# Patient Record
Sex: FEMALE | Race: WHITE | NOT HISPANIC OR LATINO | Employment: OTHER | URBAN - METROPOLITAN AREA
[De-identification: names, ages, dates, MRNs, and addresses within clinical notes are randomized per-mention and may not be internally consistent; named-entity substitution may affect disease eponyms.]

---

## 2017-08-30 ENCOUNTER — OFFICE VISIT - HEALTHEAST (OUTPATIENT)
Dept: INTERNAL MEDICINE | Facility: CLINIC | Age: 57
End: 2017-08-30

## 2017-08-30 ENCOUNTER — COMMUNICATION - HEALTHEAST (OUTPATIENT)
Dept: INTERNAL MEDICINE | Facility: CLINIC | Age: 57
End: 2017-08-30

## 2017-08-30 DIAGNOSIS — Z00.00 ROUTINE GENERAL MEDICAL EXAMINATION AT A HEALTH CARE FACILITY: ICD-10-CM

## 2017-08-30 DIAGNOSIS — E78.00 ELEVATED CHOLESTEROL: ICD-10-CM

## 2017-08-30 DIAGNOSIS — Z12.4 SCREENING FOR CERVICAL CANCER: ICD-10-CM

## 2017-08-30 DIAGNOSIS — Z83.2 FAMILY HISTORY OF THALASSEMIA: ICD-10-CM

## 2017-08-30 LAB
CHOLEST SERPL-MCNC: 245 MG/DL
FASTING STATUS PATIENT QL REPORTED: YES
HDLC SERPL-MCNC: 118 MG/DL
LDLC SERPL CALC-MCNC: 119 MG/DL
TRIGL SERPL-MCNC: 40 MG/DL

## 2017-08-30 ASSESSMENT — MIFFLIN-ST. JEOR: SCORE: 1047.27

## 2017-09-05 LAB
HPV INTERPRETATION - HISTORICAL: NORMAL
HPV INTERPRETER - HISTORICAL: NORMAL

## 2017-10-17 ENCOUNTER — OFFICE VISIT - HEALTHEAST (OUTPATIENT)
Dept: INTERNAL MEDICINE | Facility: CLINIC | Age: 57
End: 2017-10-17

## 2017-10-17 DIAGNOSIS — R21 RASH OF BODY: ICD-10-CM

## 2017-10-19 ENCOUNTER — RECORDS - HEALTHEAST (OUTPATIENT)
Dept: ADMINISTRATIVE | Facility: OTHER | Age: 57
End: 2017-10-19

## 2018-01-23 ENCOUNTER — RECORDS - HEALTHEAST (OUTPATIENT)
Dept: ADMINISTRATIVE | Facility: OTHER | Age: 58
End: 2018-01-23

## 2018-08-30 ENCOUNTER — HOSPITAL ENCOUNTER (OUTPATIENT)
Dept: MAMMOGRAPHY | Facility: CLINIC | Age: 58
Discharge: HOME | End: 2018-08-30
Attending: INTERNAL MEDICINE

## 2018-08-30 DIAGNOSIS — Z12.31 VISIT FOR SCREENING MAMMOGRAM: ICD-10-CM

## 2018-09-27 ENCOUNTER — OFFICE VISIT - HEALTHEAST (OUTPATIENT)
Dept: INTERNAL MEDICINE | Facility: CLINIC | Age: 58
End: 2018-09-27

## 2018-09-27 DIAGNOSIS — E78.2 MIXED HYPERLIPIDEMIA: ICD-10-CM

## 2018-09-27 DIAGNOSIS — Z00.00 ROUTINE GENERAL MEDICAL EXAMINATION AT A HEALTH CARE FACILITY: ICD-10-CM

## 2018-09-27 LAB
ALBUMIN SERPL-MCNC: 4.1 G/DL (ref 3.5–5)
ALP SERPL-CCNC: 111 U/L (ref 45–120)
ALT SERPL W P-5'-P-CCNC: 32 U/L (ref 0–45)
ANION GAP SERPL CALCULATED.3IONS-SCNC: 11 MMOL/L (ref 5–18)
AST SERPL W P-5'-P-CCNC: 37 U/L (ref 0–40)
BILIRUB SERPL-MCNC: 0.4 MG/DL (ref 0–1)
BUN SERPL-MCNC: 21 MG/DL (ref 8–22)
CALCIUM SERPL-MCNC: 10 MG/DL (ref 8.5–10.5)
CHLORIDE BLD-SCNC: 106 MMOL/L (ref 98–107)
CHOLEST SERPL-MCNC: 244 MG/DL
CO2 SERPL-SCNC: 25 MMOL/L (ref 22–31)
CREAT SERPL-MCNC: 0.77 MG/DL (ref 0.6–1.1)
FASTING STATUS PATIENT QL REPORTED: YES
GFR SERPL CREATININE-BSD FRML MDRD: >60 ML/MIN/1.73M2
GLUCOSE BLD-MCNC: 102 MG/DL (ref 70–125)
HDLC SERPL-MCNC: 111 MG/DL
LDLC SERPL CALC-MCNC: 124 MG/DL
POTASSIUM BLD-SCNC: 4.7 MMOL/L (ref 3.5–5)
PROT SERPL-MCNC: 6.9 G/DL (ref 6–8)
SODIUM SERPL-SCNC: 142 MMOL/L (ref 136–145)
TRIGL SERPL-MCNC: 46 MG/DL

## 2018-09-27 ASSESSMENT — MIFFLIN-ST. JEOR: SCORE: 1042.08

## 2018-10-06 ENCOUNTER — COMMUNICATION - HEALTHEAST (OUTPATIENT)
Dept: INTERNAL MEDICINE | Facility: CLINIC | Age: 58
End: 2018-10-06

## 2018-10-10 ENCOUNTER — COMMUNICATION - HEALTHEAST (OUTPATIENT)
Dept: INTERNAL MEDICINE | Facility: CLINIC | Age: 58
End: 2018-10-10

## 2018-10-22 ENCOUNTER — COMMUNICATION - HEALTHEAST (OUTPATIENT)
Dept: INTERNAL MEDICINE | Facility: CLINIC | Age: 58
End: 2018-10-22

## 2019-07-03 ENCOUNTER — RECORDS - HEALTHEAST (OUTPATIENT)
Dept: ADMINISTRATIVE | Facility: OTHER | Age: 59
End: 2019-07-03

## 2019-09-30 ENCOUNTER — OFFICE VISIT - HEALTHEAST (OUTPATIENT)
Dept: FAMILY MEDICINE | Facility: CLINIC | Age: 59
End: 2019-09-30

## 2019-09-30 DIAGNOSIS — Z13.820 SCREENING FOR OSTEOPOROSIS: ICD-10-CM

## 2019-09-30 DIAGNOSIS — Z11.4 SCREENING FOR HIV (HUMAN IMMUNODEFICIENCY VIRUS): ICD-10-CM

## 2019-09-30 DIAGNOSIS — L90.0 LICHEN SCLEROSUS: ICD-10-CM

## 2019-09-30 DIAGNOSIS — Z11.59 ENCOUNTER FOR HEPATITIS C SCREENING TEST FOR LOW RISK PATIENT: ICD-10-CM

## 2019-09-30 DIAGNOSIS — M25.551 HIP PAIN, RIGHT: ICD-10-CM

## 2019-09-30 DIAGNOSIS — Z00.00 HEALTH CARE MAINTENANCE: ICD-10-CM

## 2019-09-30 DIAGNOSIS — G62.9 NEUROPATHY: ICD-10-CM

## 2019-09-30 DIAGNOSIS — Z78.0 ASYMPTOMATIC MENOPAUSE: ICD-10-CM

## 2019-09-30 LAB
ALBUMIN SERPL-MCNC: 4.5 G/DL (ref 3.5–5)
ALP SERPL-CCNC: 100 U/L (ref 45–120)
ALT SERPL W P-5'-P-CCNC: 18 U/L (ref 0–45)
ANION GAP SERPL CALCULATED.3IONS-SCNC: 9 MMOL/L (ref 5–18)
AST SERPL W P-5'-P-CCNC: 25 U/L (ref 0–40)
BILIRUB SERPL-MCNC: 0.6 MG/DL (ref 0–1)
BUN SERPL-MCNC: 17 MG/DL (ref 8–22)
CALCIUM SERPL-MCNC: 10.6 MG/DL (ref 8.5–10.5)
CHLORIDE BLD-SCNC: 105 MMOL/L (ref 98–107)
CHOLEST SERPL-MCNC: 253 MG/DL
CO2 SERPL-SCNC: 29 MMOL/L (ref 22–31)
CREAT SERPL-MCNC: 0.91 MG/DL (ref 0.6–1.1)
FASTING STATUS PATIENT QL REPORTED: YES
GFR SERPL CREATININE-BSD FRML MDRD: >60 ML/MIN/1.73M2
GLUCOSE BLD-MCNC: 103 MG/DL (ref 70–125)
HDLC SERPL-MCNC: 132 MG/DL
HIV 1+2 AB+HIV1 P24 AG SERPL QL IA: NEGATIVE
LDLC SERPL CALC-MCNC: 108 MG/DL
POTASSIUM BLD-SCNC: 4.9 MMOL/L (ref 3.5–5)
PROT SERPL-MCNC: 7.4 G/DL (ref 6–8)
SODIUM SERPL-SCNC: 143 MMOL/L (ref 136–145)
TRIGL SERPL-MCNC: 63 MG/DL

## 2019-09-30 ASSESSMENT — MIFFLIN-ST. JEOR: SCORE: 1034.68

## 2019-10-01 ENCOUNTER — COMMUNICATION - HEALTHEAST (OUTPATIENT)
Dept: FAMILY MEDICINE | Facility: CLINIC | Age: 59
End: 2019-10-01

## 2019-10-01 LAB — HCV AB SERPL QL IA: NEGATIVE

## 2019-10-04 ENCOUNTER — COMMUNICATION - HEALTHEAST (OUTPATIENT)
Dept: FAMILY MEDICINE | Facility: CLINIC | Age: 59
End: 2019-10-04

## 2019-10-04 ENCOUNTER — AMBULATORY - HEALTHEAST (OUTPATIENT)
Dept: FAMILY MEDICINE | Facility: CLINIC | Age: 59
End: 2019-10-04

## 2019-10-04 DIAGNOSIS — E83.52 HYPERCALCEMIA: ICD-10-CM

## 2019-10-15 ENCOUNTER — RECORDS - HEALTHEAST (OUTPATIENT)
Dept: ADMINISTRATIVE | Facility: OTHER | Age: 59
End: 2019-10-15

## 2019-10-15 ENCOUNTER — RECORDS - HEALTHEAST (OUTPATIENT)
Dept: BONE DENSITY | Facility: CLINIC | Age: 59
End: 2019-10-15

## 2019-10-15 DIAGNOSIS — Z78.0 ASYMPTOMATIC MENOPAUSAL STATE: ICD-10-CM

## 2019-10-15 DIAGNOSIS — Z13.820 ENCOUNTER FOR SCREENING FOR OSTEOPOROSIS: ICD-10-CM

## 2019-10-22 ENCOUNTER — RECORDS - HEALTHEAST (OUTPATIENT)
Dept: ADMINISTRATIVE | Facility: OTHER | Age: 59
End: 2019-10-22

## 2019-11-12 ENCOUNTER — AMBULATORY - HEALTHEAST (OUTPATIENT)
Dept: LAB | Facility: CLINIC | Age: 59
End: 2019-11-12

## 2019-11-12 ENCOUNTER — COMMUNICATION - HEALTHEAST (OUTPATIENT)
Dept: FAMILY MEDICINE | Facility: CLINIC | Age: 59
End: 2019-11-12

## 2019-11-12 DIAGNOSIS — E83.52 HYPERCALCEMIA: ICD-10-CM

## 2019-11-12 LAB
CALCIUM SERPL-MCNC: 9.4 MG/DL (ref 8.5–10.5)
CALCIUM, IONIZED MEASURED: 1.23 MMOL/L (ref 1.11–1.3)
ION CA PH 7.4: 1.17 MMOL/L (ref 1.11–1.3)
PH: 7.27 (ref 7.35–7.45)

## 2019-12-02 ENCOUNTER — COMMUNICATION - HEALTHEAST (OUTPATIENT)
Dept: FAMILY MEDICINE | Facility: CLINIC | Age: 59
End: 2019-12-02

## 2019-12-02 DIAGNOSIS — Z91.013 SEAFOOD ALLERGY: ICD-10-CM

## 2020-01-13 ENCOUNTER — RECORDS - HEALTHEAST (OUTPATIENT)
Dept: ADMINISTRATIVE | Facility: OTHER | Age: 60
End: 2020-01-13

## 2020-06-11 ENCOUNTER — RECORDS - HEALTHEAST (OUTPATIENT)
Dept: ADMINISTRATIVE | Facility: OTHER | Age: 60
End: 2020-06-11

## 2020-06-25 ENCOUNTER — RECORDS - HEALTHEAST (OUTPATIENT)
Dept: ADMINISTRATIVE | Facility: OTHER | Age: 60
End: 2020-06-25

## 2020-07-14 ENCOUNTER — RECORDS - HEALTHEAST (OUTPATIENT)
Dept: ADMINISTRATIVE | Facility: OTHER | Age: 60
End: 2020-07-14

## 2020-07-27 ENCOUNTER — RECORDS - HEALTHEAST (OUTPATIENT)
Dept: ADMINISTRATIVE | Facility: OTHER | Age: 60
End: 2020-07-27

## 2020-08-10 ENCOUNTER — OFFICE VISIT - HEALTHEAST (OUTPATIENT)
Dept: FAMILY MEDICINE | Facility: CLINIC | Age: 60
End: 2020-08-10

## 2020-08-10 DIAGNOSIS — Z01.818 PREOP GENERAL PHYSICAL EXAM: ICD-10-CM

## 2020-08-10 DIAGNOSIS — H26.9 CATARACT OF BOTH EYES, UNSPECIFIED CATARACT TYPE: ICD-10-CM

## 2020-08-10 LAB
ATRIAL RATE - MUSE: 58 BPM
BASOPHILS # BLD AUTO: 0 THOU/UL (ref 0–0.2)
BASOPHILS NFR BLD AUTO: 0 % (ref 0–2)
DIASTOLIC BLOOD PRESSURE - MUSE: NORMAL
EOSINOPHIL # BLD AUTO: 0.2 THOU/UL (ref 0–0.4)
EOSINOPHIL NFR BLD AUTO: 4 % (ref 0–6)
ERYTHROCYTE [DISTWIDTH] IN BLOOD BY AUTOMATED COUNT: 14.5 % (ref 11–14.5)
HCT VFR BLD AUTO: 36.9 % (ref 35–47)
HGB BLD-MCNC: 12.7 G/DL (ref 12–16)
INTERPRETATION ECG - MUSE: NORMAL
LYMPHOCYTES # BLD AUTO: 1.2 THOU/UL (ref 0.8–4.4)
LYMPHOCYTES NFR BLD AUTO: 29 % (ref 20–40)
MCH RBC QN AUTO: 32.5 PG (ref 27–34)
MCHC RBC AUTO-ENTMCNC: 34.3 G/DL (ref 32–36)
MCV RBC AUTO: 95 FL (ref 80–100)
MONOCYTES # BLD AUTO: 0.4 THOU/UL (ref 0–0.9)
MONOCYTES NFR BLD AUTO: 10 % (ref 2–10)
NEUTROPHILS # BLD AUTO: 2.3 THOU/UL (ref 2–7.7)
NEUTROPHILS NFR BLD AUTO: 56 % (ref 50–70)
P AXIS - MUSE: 75 DEGREES
PLATELET # BLD AUTO: 279 THOU/UL (ref 140–440)
PMV BLD AUTO: 8.1 FL (ref 7–10)
POTASSIUM BLD-SCNC: 4.2 MMOL/L (ref 3.5–5)
PR INTERVAL - MUSE: 170 MS
QRS DURATION - MUSE: 68 MS
QT - MUSE: 382 MS
QTC - MUSE: 374 MS
R AXIS - MUSE: 57 DEGREES
RBC # BLD AUTO: 3.89 MILL/UL (ref 3.8–5.4)
SYSTOLIC BLOOD PRESSURE - MUSE: NORMAL
T AXIS - MUSE: 47 DEGREES
VENTRICULAR RATE- MUSE: 58 BPM
WBC: 4.1 THOU/UL (ref 4–11)

## 2020-08-10 ASSESSMENT — MIFFLIN-ST. JEOR: SCORE: 1034.03

## 2020-09-01 ENCOUNTER — HOSPITAL ENCOUNTER (OUTPATIENT)
Dept: MAMMOGRAPHY | Facility: CLINIC | Age: 60
Discharge: HOME | End: 2020-09-01

## 2020-09-01 DIAGNOSIS — Z12.31 VISIT FOR SCREENING MAMMOGRAM: ICD-10-CM

## 2020-09-28 ENCOUNTER — COMMUNICATION - HEALTHEAST (OUTPATIENT)
Dept: FAMILY MEDICINE | Facility: CLINIC | Age: 60
End: 2020-09-28

## 2020-10-12 ENCOUNTER — RECORDS - HEALTHEAST (OUTPATIENT)
Dept: ADMINISTRATIVE | Facility: OTHER | Age: 60
End: 2020-10-12

## 2020-10-13 ENCOUNTER — COMMUNICATION - HEALTHEAST (OUTPATIENT)
Dept: FAMILY MEDICINE | Facility: CLINIC | Age: 60
End: 2020-10-13

## 2020-10-15 ENCOUNTER — OFFICE VISIT - HEALTHEAST (OUTPATIENT)
Dept: FAMILY MEDICINE | Facility: CLINIC | Age: 60
End: 2020-10-15

## 2020-10-15 DIAGNOSIS — Z00.00 HEALTHCARE MAINTENANCE: ICD-10-CM

## 2020-10-15 DIAGNOSIS — Z01.818 PREOP GENERAL PHYSICAL EXAM: ICD-10-CM

## 2020-10-15 DIAGNOSIS — Z12.11 SCREEN FOR COLON CANCER: ICD-10-CM

## 2020-10-15 DIAGNOSIS — H25.9 SENILE CATARACT OF RIGHT EYE, UNSPECIFIED AGE-RELATED CATARACT TYPE: ICD-10-CM

## 2020-10-15 LAB
ALBUMIN SERPL-MCNC: 4.2 G/DL (ref 3.5–5)
ALBUMIN UR-MCNC: NEGATIVE MG/DL
ALP SERPL-CCNC: 91 U/L (ref 45–120)
ALT SERPL W P-5'-P-CCNC: 25 U/L (ref 0–45)
ANION GAP SERPL CALCULATED.3IONS-SCNC: 6 MMOL/L (ref 5–18)
APPEARANCE UR: CLEAR
AST SERPL W P-5'-P-CCNC: 30 U/L (ref 0–40)
BACTERIA #/AREA URNS HPF: ABNORMAL HPF
BILIRUB SERPL-MCNC: 0.6 MG/DL (ref 0–1)
BILIRUB UR QL STRIP: NEGATIVE
BUN SERPL-MCNC: 21 MG/DL (ref 8–22)
CALCIUM SERPL-MCNC: 9.5 MG/DL (ref 8.5–10.5)
CHLORIDE BLD-SCNC: 105 MMOL/L (ref 98–107)
CHOLEST SERPL-MCNC: 234 MG/DL
CO2 SERPL-SCNC: 30 MMOL/L (ref 22–31)
COLOR UR AUTO: YELLOW
CREAT SERPL-MCNC: 0.8 MG/DL (ref 0.6–1.1)
FASTING STATUS PATIENT QL REPORTED: YES
GFR SERPL CREATININE-BSD FRML MDRD: >60 ML/MIN/1.73M2
GLUCOSE BLD-MCNC: 93 MG/DL (ref 70–125)
GLUCOSE UR STRIP-MCNC: NEGATIVE MG/DL
HDLC SERPL-MCNC: 117 MG/DL
HGB UR QL STRIP: ABNORMAL
KETONES UR STRIP-MCNC: NEGATIVE MG/DL
LDLC SERPL CALC-MCNC: 107 MG/DL
LEUKOCYTE ESTERASE UR QL STRIP: NEGATIVE
NITRATE UR QL: NEGATIVE
PH UR STRIP: 7 [PH] (ref 5–8)
POTASSIUM BLD-SCNC: 4.7 MMOL/L (ref 3.5–5)
PROT SERPL-MCNC: 6.8 G/DL (ref 6–8)
RBC #/AREA URNS AUTO: ABNORMAL HPF
SODIUM SERPL-SCNC: 141 MMOL/L (ref 136–145)
SP GR UR STRIP: 1.02 (ref 1–1.03)
SQUAMOUS #/AREA URNS AUTO: ABNORMAL LPF
TRIGL SERPL-MCNC: 51 MG/DL
UROBILINOGEN UR STRIP-ACNC: ABNORMAL
WBC #/AREA URNS AUTO: ABNORMAL HPF

## 2020-10-15 ASSESSMENT — MIFFLIN-ST. JEOR: SCORE: 1039.22

## 2020-10-20 ENCOUNTER — COMMUNICATION - HEALTHEAST (OUTPATIENT)
Dept: FAMILY MEDICINE | Facility: CLINIC | Age: 60
End: 2020-10-20

## 2020-10-24 ENCOUNTER — OFFICE VISIT (OUTPATIENT)
Dept: URGENT CARE | Facility: URGENT CARE | Age: 60
End: 2020-10-24
Payer: COMMERCIAL

## 2020-10-24 VITALS
SYSTOLIC BLOOD PRESSURE: 117 MMHG | WEIGHT: 114.8 LBS | HEART RATE: 68 BPM | TEMPERATURE: 97.3 F | DIASTOLIC BLOOD PRESSURE: 72 MMHG | OXYGEN SATURATION: 100 % | RESPIRATION RATE: 17 BRPM

## 2020-10-24 DIAGNOSIS — L03.114 CELLULITIS OF LEFT HAND: ICD-10-CM

## 2020-10-24 DIAGNOSIS — S60.552A SPLINTER OF LEFT HAND: Primary | ICD-10-CM

## 2020-10-24 PROCEDURE — 99203 OFFICE O/P NEW LOW 30 MIN: CPT | Performed by: FAMILY MEDICINE

## 2020-10-24 RX ORDER — CEPHALEXIN 500 MG/1
500 CAPSULE ORAL 3 TIMES DAILY
Qty: 21 CAPSULE | Refills: 0 | Status: SHIPPED | OUTPATIENT
Start: 2020-10-24 | End: 2020-10-31

## 2020-10-24 RX ORDER — FOLIC ACID 0.8 MG
800 TABLET ORAL DAILY
COMMUNITY
End: 2023-12-01

## 2020-10-24 NOTE — PATIENT INSTRUCTIONS
Apply warmth onto the red, tender areas of the left hand for 15 minutes at a time, every 2-3 hours while awake.      You may apply ice at times to numb up the pain.      Tylenol, Ibuprofen for the pain.      Elevate the left hand above the level of the heart to decrease some of the pain.      Go to the emergency room if you develop fevers (temperature 100.4 F or above), spreading redness despite 48 hours of Cephalexin antibiotic treatment, if the pain keeps worsening.      follow up with an orthopedic hand specialist this week for further evaluation and treatment.

## 2020-10-24 NOTE — PROGRESS NOTES
SUBJECTIVE:   Estefany Javier is a 60 year old right-handed female presenting with a chief complaint of soreness, redness, throbbing over the remnants of a wooden splinter embedded in the left palm overlying the fifth metacarpal area, patient feels that there is still something embedded at the left hand. Yesterday, at around 5 pm, patient was wiping the stair banisters when a wooden splinter embedded into the patient's left hand. .  Patient and her  removed most of the sliver/splinter from the left hand with a tweezer.  Patient also has applied Neosporin ointment.     Onset of symptoms was one day ago.  Course of illness is worsening pain and redness.  .    Severity severe pain.   Current and Associated symptoms: as listed above.   .  No fevers.  No red streaks.  No pus was visible.  There is pain when making a fist with the left hand and when extending the left fifth finger.     Patient is up to date with her tetanus shot.      Past Medical History:    Lichen Sclerosis    Current Outpatient Medications   Medication Sig Dispense Refill     folic acid 800 MCG tablet Take 800 mcg by mouth daily       methotrexate 2.5 MG tablet Take by mouth every 7 days       Social History     Tobacco Use     Smoking status: Not on file   Substance Use Topics     Alcohol use: Not on file       ROS:  CONSTITUTIONAL:NEGATIVE  for fevers.   INTEGUMENTARY/SKIN: positive for redness, swelling at the left hand.   MUSCULOSKELETAL: positive for pain at the left palm.    NEURO: No numbness nor weakness at the left hand.      OBJECTIVE:  /72   Pulse 68   Temp 97.3  F (36.3  C)   Resp 17   Wt 52.1 kg (114 lb 12.8 oz)   SpO2 100%   GENERAL APPEARANCE: healthy, alert and no distress  MS: left palm:  There is a puncture jailyn at the left palm overlying the fifth metacarpal bone region.  There is overlying edema, blanching, erythema, and pain with palpation.  Patient has pain when making a fist and when stretching the fingers.  No  red streaks.  There are no obvious protruding splinter ends.  I was unable to trace any obvious paths of the splinter with palpation.      ASSESSMENT:  Cellulitis of the left hand  Remnants of a splinter in the left hand.     PLAN:  follow up with an orthopedic hand specialist. I ordered a referral for this orthopedic evaluation.     Rx:  Cephalexin    Tylenol, ibuprofen    Place warmth    Go to the emergency room if you develop fevers (temperature 100.4 F or above), spreading redness despite 48 hours of Cephalexin antibiotic treatment, if the pain keeps worsening.          Elmer Mac MD

## 2020-10-26 ENCOUNTER — TRANSFERRED RECORDS (OUTPATIENT)
Dept: HEALTH INFORMATION MANAGEMENT | Facility: CLINIC | Age: 60
End: 2020-10-26
Payer: COMMERCIAL

## 2020-10-26 ENCOUNTER — RECORDS - HEALTHEAST (OUTPATIENT)
Dept: ADMINISTRATIVE | Facility: OTHER | Age: 60
End: 2020-10-26

## 2020-11-02 ENCOUNTER — RECORDS - HEALTHEAST (OUTPATIENT)
Dept: ADMINISTRATIVE | Facility: OTHER | Age: 60
End: 2020-11-02
Payer: COMMERCIAL

## 2020-11-12 ENCOUNTER — RECORDS - HEALTHEAST (OUTPATIENT)
Dept: ADMINISTRATIVE | Facility: OTHER | Age: 60
End: 2020-11-12

## 2020-11-13 ENCOUNTER — COMMUNICATION - HEALTHEAST (OUTPATIENT)
Dept: FAMILY MEDICINE | Facility: CLINIC | Age: 60
End: 2020-11-13

## 2020-12-01 ENCOUNTER — COMMUNICATION - HEALTHEAST (OUTPATIENT)
Dept: FAMILY MEDICINE | Facility: CLINIC | Age: 60
End: 2020-12-01

## 2020-12-01 DIAGNOSIS — Z91.013 SEAFOOD ALLERGY: ICD-10-CM

## 2020-12-15 ENCOUNTER — RECORDS - HEALTHEAST (OUTPATIENT)
Dept: GENERAL RADIOLOGY | Facility: CLINIC | Age: 60
End: 2020-12-15

## 2020-12-15 ENCOUNTER — OFFICE VISIT - HEALTHEAST (OUTPATIENT)
Dept: FAMILY MEDICINE | Facility: CLINIC | Age: 60
End: 2020-12-15

## 2020-12-15 DIAGNOSIS — R10.9 LEFT FLANK PAIN: ICD-10-CM

## 2020-12-15 DIAGNOSIS — S20.212A CONTUSION OF LEFT FRONT WALL OF THORAX, INITIAL ENCOUNTER: ICD-10-CM

## 2020-12-15 DIAGNOSIS — R31.9 HEMATURIA, UNSPECIFIED TYPE: ICD-10-CM

## 2020-12-15 DIAGNOSIS — S20.212A RIB CONTUSION, LEFT, INITIAL ENCOUNTER: ICD-10-CM

## 2020-12-15 LAB
ALBUMIN UR-MCNC: NEGATIVE MG/DL
APPEARANCE UR: CLEAR
BACTERIA #/AREA URNS HPF: ABNORMAL HPF
BILIRUB UR QL STRIP: NEGATIVE
COLOR UR AUTO: YELLOW
GLUCOSE UR STRIP-MCNC: NEGATIVE MG/DL
HGB UR QL STRIP: ABNORMAL
KETONES UR STRIP-MCNC: NEGATIVE MG/DL
LEUKOCYTE ESTERASE UR QL STRIP: NEGATIVE
MUCOUS THREADS #/AREA URNS LPF: ABNORMAL LPF
NITRATE UR QL: NEGATIVE
PH UR STRIP: 5.5 [PH] (ref 5–8)
RBC #/AREA URNS AUTO: ABNORMAL HPF
SP GR UR STRIP: 1.02 (ref 1–1.03)
SQUAMOUS #/AREA URNS AUTO: ABNORMAL LPF
UROBILINOGEN UR STRIP-ACNC: ABNORMAL
WBC #/AREA URNS AUTO: ABNORMAL HPF

## 2020-12-16 ENCOUNTER — AMBULATORY - HEALTHEAST (OUTPATIENT)
Dept: FAMILY MEDICINE | Facility: CLINIC | Age: 60
End: 2020-12-16

## 2020-12-16 ENCOUNTER — COMMUNICATION - HEALTHEAST (OUTPATIENT)
Dept: FAMILY MEDICINE | Facility: CLINIC | Age: 60
End: 2020-12-16

## 2020-12-16 DIAGNOSIS — R10.9 LEFT FLANK PAIN: ICD-10-CM

## 2020-12-23 ENCOUNTER — HOSPITAL ENCOUNTER (OUTPATIENT)
Dept: CT IMAGING | Facility: CLINIC | Age: 60
Discharge: HOME | End: 2020-12-23
Attending: FAMILY MEDICINE

## 2020-12-23 DIAGNOSIS — R10.9 LEFT FLANK PAIN: ICD-10-CM

## 2021-02-01 ENCOUNTER — COMMUNICATION - HEALTHEAST (OUTPATIENT)
Dept: FAMILY MEDICINE | Facility: CLINIC | Age: 61
End: 2021-02-01

## 2021-05-31 ENCOUNTER — RECORDS - HEALTHEAST (OUTPATIENT)
Dept: ADMINISTRATIVE | Facility: CLINIC | Age: 61
End: 2021-05-31

## 2021-05-31 VITALS — BODY MASS INDEX: 20 KG/M2 | WEIGHT: 112.9 LBS | HEIGHT: 63 IN

## 2021-05-31 VITALS — BODY MASS INDEX: 20.77 KG/M2 | WEIGHT: 116.3 LBS

## 2021-06-01 NOTE — TELEPHONE ENCOUNTER
Left message to call back for: results  Information to relay to patient:  Please notify pt and schedule lab appt

## 2021-06-01 NOTE — PROGRESS NOTES
Assessment/Plan:     Patient presents today for routine physical examination.    Healthcare Maintenance: USPSTF recommendations for age 59;  Patient has been counseled on/screened for:  - intimate partner violence and there are no concerns at this time  - a healthful diet and physical activity for CVD disease prevention  - Diabetes and hyperlipidemia: screening was performed.   - Hep C, ordered today  - Asprin use: patient chose to consider it  Sexually transmitted infections: Patient would no like to be screened for chlamydia, gonorrhea, syphilis, HIV, and hepatitis, HIV ok  Colorectal Cancer: Colonoscopy last performed in 2011, return in 10 years  Immunizations: up to date except for influenza and shingles which was recommended  Cervical Cancer Screening: patient has been screened for cervical cancer per protocol in 2017, results were negative with negative HPV due in 2022  Breast Cancer: Last mammogram in 2018  Bone Density: Dexa scan ordered      Additional concerns are as detailed below:    1. Neuropathy  2. Hip pain, right  Patient's persistent stocking distribution of right sided lower extremity neuropathy is a great concern for the patient for the last 6 years.  We discussed the options of treating with gabapentin or TCA, additional diagnostic work-up with an x-ray followed by an MRI versus an EMG, versus a referral to orthopedics.  Patient would prefer a referral and I think this is reasonable.  - Ambulatory referral to Orthopedics    3. Lichen sclerosus  - managed by ob/gyn      AVS printed and given to patient.  Return to clinic in 1 year.    I have had an Advance Directives discussion with the patient.    Total time spent with patient was 45 minutes with greater than 50% spent in face-to-face counseling regarding the above plan.    This note has been dictated using voice recognition software. Any grammatical or context distortions are unintentional and inherent to the the software.     Roselyn  "MD Jace  [facility]    Subjective:     Estefany Javier is a 59 y.o. female who presents to clinic for routine physical and to establish care.    Additional concerns include:    1.  Patient has a history of a microdiscectomy between L4 and L5 less than a decade ago.  She had extensive imaging at that time.  She also has a known torn labrum in the right hip.  She has had a hip injection in 2014.  She has had extensive physical therapy.  She persistent having discomfort.  This discomfort is described as 3 separate sensations.  The most concerning is a stocking distribution in the right lower extremity of paresthesias.  It is not a pain, but will persist all day when aggravated.  She usually will have some irritation at least daily.  It is worsened with sitting.  Once it has begun, she has not noticed any improvement despite changing positions, standing, or physical therapy.  The only thing that will improve her discomfort is lying down to sleep.  Patient has not taken medication for this.  She also endorses slight \"lump\" feeling in the right groin, that is more prominent when she bends at the waist.  She also notices some slight discomfort along the iliac crest on the right side.  She notices no significant back pain.  She notices no discomfort with high knees.    Diet: well balanced diet  Physical Activity: The patient maintains a regular, healthy exercise program.    PHQ-2 Score:  0    Health Care Directive: not on file but handout provided    Patient Care Team:   PCP: Roselyn Mccormick    Past Medical History, Family History, and Social History reviewed.     Review of systems is as stated in HPI.  Patient endorses: joint pain and back pain  The remainder of the 10 system review is otherwise negative.    Objective:     /80   Pulse 74   Ht 5' 2.5\" (1.588 m)   Wt 111 lb (50.3 kg)   SpO2 100%   BMI 19.98 kg/m    Gen: Alert, NAD, appears stated age, normal hygiene   Eyes: conjunctivae " without injection, sclera clear, EOMI  ENT/mouth: nares clear, septum midline, absent rhinorrhea,absent pharyngeal injection, neck is supple, no thyroid enlargement  CV: RRR, no murmur appreciated, pedal edema absent bilaterally  Resp: CTAB, no wheezes, rales or ronchi  ABD: normoactive, non-tender to palpation, nondistended  MSK: grossly full range of motion in all joints, no obvious deformity, no piriformis discomfort, no evidence of foot drop, no decreased range of motion in the spine, nontender to palpation along the iliac crests or in the right groin  Neuro: CN II-XII grossly intact, no deficits in coordination  Psych: no apparent hallucinations or delusions, no pressured speech; alert, oriented x3  SKIN: dry and without lesions  Heme/lymph: no pallor, no active bleeding/bruising, no adenopathy appreciated    Medications:  Current Outpatient Medications   Medication Sig     CALCIUM CARBONATE/VITAMIN D3 (CALTRATE 600 + D ORAL) Take by mouth.     clobetasol (TEMOVATE) 0.05 % cream APPLY TO AFFECTED AREA TWICE A DAY AS NEEDED     EPINEPHrine (EPIPEN/ADRENACLICK/AUVI-Q) 0.3 mg/0.3 mL injection Inject 0.3 mL (0.3 mg total) as directed as needed for anaphylaxis. Inject into thigh.     omega-3 fatty acids-vitamin E (FISH OIL) 1,000 mg cap Take by mouth.       Allergies:  Allergies   Allergen Reactions     Iodine      Shellfish Containing Products        PMH:  Past Medical History:   Diagnosis Date     Adhesive capsulitis of shoulder     Created by Conversion      Anemia     Created by Conversion      Incontinence      Leukopenia     Created by Conversion        PSH:  Past Surgical History:   Procedure Laterality Date     MICRODISCECTOMY LUMBAR       [place] REPAIR BLADDER WOUND/INJ,SIMPLE      Description: Bladder Cystorrhaphy;  Recorded: 05/06/2008;       Family Hx:  Family History   Problem Relation Age of Onset     Dementia Mother      Prostate cancer Father      Heart disease Father      Thalassemia Sister          alpha thalassemia trait     BRCA 1/2 Neg Hx      Breast cancer Neg Hx      Cancer Neg Hx      Colon cancer Neg Hx      Endometrial cancer Neg Hx      Ovarian cancer Neg Hx        Social History:  Social History     Socioeconomic History     Marital status: Single     Spouse name: Not on file     Number of children: Not on file     Years of education: Not on file     Highest education level: Not on file   Occupational History     Not on file   Social Needs     Financial resource strain: Not on file     Food insecurity:     Worry: Not on file     Inability: Not on file     Transportation needs:     Medical: Not on file     Non-medical: Not on file   Tobacco Use     Smoking status: Former Smoker     Packs/day: 0.00     Last attempt to quit: 1995     Years since quittin.1     Smokeless tobacco: Never Used   Substance and Sexual Activity     Alcohol use: Yes     Alcohol/week: 7.0 standard drinks     Types: 7 Glasses of wine per week     Drug use: Never     Sexual activity: Yes     Partners: Male   Lifestyle     Physical activity:     Days per week: Not on file     Minutes per session: Not on file     Stress: Not on file   Relationships     Social connections:     Talks on phone: Not on file     Gets together: Not on file     Attends Yazidi service: Not on file     Active member of club or organization: Not on file     Attends meetings of clubs or organizations: Not on file     Relationship status: Not on file     Intimate partner violence:     Fear of current or ex partner: Not on file     Emotionally abused: Not on file     Physically abused: Not on file     Forced sexual activity: Not on file   Other Topics Concern     Not on file   Social History Narrative     Not on file       LDL Calculated (mg/dL)   Date Value   2018 124   2017 119   2016 113        Immunization History   Administered Date(s) Administered     Hep A, Adult IM (19yr & older) 2009, 2010     Hep A, historic  05/14/2009, 06/17/2010     Influenza, Seasonal, Inj PF IIV3 10/19/2009     Influenza, inj, historic,unspecified 10/07/2015, 10/18/2017     Influenza,seasonal, Inj IIV3 11/01/2010, 10/24/2011, 10/08/2012, 10/14/2013, 10/20/2014     Td, Adult, Absorbed 08/30/2017     Td,adult,historic,unspecified 05/04/2007     Tdap 05/04/2007     ZOSTER, LIVE 07/17/2014     There are no preventive care reminders to display for this patient.

## 2021-06-01 NOTE — TELEPHONE ENCOUNTER
----- Message from Bonita Gallardo CMA sent at 10/1/2019 12:21 PM CDT -----  Regarding: FW: calcium    ----- Message -----  From: Roselyn Mccormick MD  Sent: 10/1/2019  12:20 PM CDT  To: Roselyn Mccormick Care Team Pool  Subject: calcium                                          Patient's calcium is slightly high. I would like to recheck at her convenience. She will need a lab only appt but I was hoping you'd be willing to reach out to her to ensure she knows she needs to schedule.  ----- Message -----  From: Lab, Background User  Sent: 9/30/2019   4:43 PM CDT  To: Roselyn Mccormick MD

## 2021-06-02 ENCOUNTER — RECORDS - HEALTHEAST (OUTPATIENT)
Dept: ADMINISTRATIVE | Facility: CLINIC | Age: 61
End: 2021-06-02

## 2021-06-02 VITALS — HEIGHT: 63 IN | WEIGHT: 111.6 LBS | BODY MASS INDEX: 19.77 KG/M2

## 2021-06-03 VITALS
OXYGEN SATURATION: 100 % | BODY MASS INDEX: 19.67 KG/M2 | HEART RATE: 74 BPM | SYSTOLIC BLOOD PRESSURE: 110 MMHG | DIASTOLIC BLOOD PRESSURE: 80 MMHG | WEIGHT: 111 LBS | HEIGHT: 63 IN

## 2021-06-03 NOTE — TELEPHONE ENCOUNTER
Patient Returning Call  Reason for call:  Returning call  Information relayed to patient:  Below message  Patient has additional questions:  No  If YES, what are your questions/concerns:  Patient just needs Lab. Reschedule for lab only visit today at 0315 pm. Thanks.  Okay to leave a detailed message?: No call back needed

## 2021-06-03 NOTE — TELEPHONE ENCOUNTER
Left message to call back for: Appt today with dr. Mccormick at 230  Information to relay to patient:  LM for pt to clarify if she needs appt to discuss anything with dr. Mccormick? Otherwise she just needs lab only appt today.,

## 2021-06-03 NOTE — TELEPHONE ENCOUNTER
Upcoming Appointment Question  When is the appointment: Today  What is your appointment for?: Calcium check  Who is your appointment scheduled with?: [facility]  What is your question/concern?: Fyi: patient has lab visit scheduled for today. Appears order in chart. Thanks.  Okay to leave a detailed message?: No return call needed.

## 2021-06-04 VITALS
WEIGHT: 111.2 LBS | HEIGHT: 62 IN | BODY MASS INDEX: 20.46 KG/M2 | OXYGEN SATURATION: 98 % | DIASTOLIC BLOOD PRESSURE: 78 MMHG | HEART RATE: 71 BPM | SYSTOLIC BLOOD PRESSURE: 106 MMHG

## 2021-06-05 ENCOUNTER — RECORDS - HEALTHEAST (OUTPATIENT)
Dept: SCHEDULING | Facility: CLINIC | Age: 61
End: 2021-06-05

## 2021-06-05 VITALS
OXYGEN SATURATION: 99 % | HEART RATE: 71 BPM | SYSTOLIC BLOOD PRESSURE: 118 MMHG | WEIGHT: 114.6 LBS | BODY MASS INDEX: 20.63 KG/M2 | DIASTOLIC BLOOD PRESSURE: 64 MMHG

## 2021-06-05 VITALS
DIASTOLIC BLOOD PRESSURE: 70 MMHG | BODY MASS INDEX: 19.84 KG/M2 | HEART RATE: 64 BPM | OXYGEN SATURATION: 100 % | SYSTOLIC BLOOD PRESSURE: 102 MMHG | WEIGHT: 112 LBS | HEIGHT: 63 IN

## 2021-06-05 DIAGNOSIS — M25.559 PAIN IN JOINT, PELVIC REGION AND THIGH: ICD-10-CM

## 2021-06-10 NOTE — PROGRESS NOTES
Preoperative Exam    Scheduled Procedure: Cataract Surgery- Both eyes Left 8/12 Right 2 weeks later  Surgery Date:  8/12/2020  Surgery Location: [facility], [place], fax [phone]    Surgeon:  Dr. Gordon     Assessment/Plan:     1. Cataract of both eyes, unspecified cataract type  Work-up to include  - HM1(CBC and Differential)  - Potassium  - Electrocardiogram Perform - Clinic  - HM1 (CBC with Diff)    2. Preop general physical exam  Work-up to include  - HM1(CBC and Differential)  - Potassium  - Electrocardiogram Perform - Clinic  - HM1 (CBC with Diff)     Surgical Procedure Risk: Low (reported cardiac risk generally < 1%)  Have you had prior anesthesia?: No  Have you or any family members had a previous anesthesia reaction:  Yes: usually gets sick; nauesa   Do you or any family members have a history of a clotting or bleeding disorder?: No  Cardiac Risk Assessment: no increased risk for major cardiac complications    APPROVAL GIVEN to proceed with proposed procedure, without further diagnostic evaluation        Functional Status: Independent  Patient plans to recover at home with family.     Subjective:      Estefany Javier is a 59 y.o. female who presents for a preoperative consultation.  Patient has been noticing decreasing vision for over a year now.  Previously she saw a optometrist.  Her family eye doctor has been Dr. Santana and she had a consultation with him and he noticed rather marked cataracts in the left eye and also another cataract in the right eye.  She is scheduled for phacoemulsification of the left eye first and then to follow-up 2 weeks later the right eye.  She has been very healthy.  She does have lichen sclerosis moderately severe and is under antimetabolite therapy once weekly.  She is under the care of dermatology.  She is getting a good response also using clobetasol cream daily.  System review unremarkable the patient has been therapeutically optimized for anticipated  procedures.  All medical questions asked were answered at this physical examination should satisfy 2 surgeries.  Pleasure to meter.  She may go ahead and schedule her colonoscopy.    All other systems reviewed and are negative, other than those listed in the HPI.    Pertinent History  Do you have difficulty breathing or chest pain after walking up a flight of stairs: No  History of obstructive sleep apnea: No  Steroid use in the last 6 months: No  Frequent Aspirin/NSAID use: Yes: Takes 2-3 advil a day   Prior Blood Transfusion: No  Prior Blood Transfusion Reaction: No  If for some reason prior to, during or after the procedure, if it is medically indicated, would you be willing to have a blood transfusion?:  There is no transfusion refusal.    Current Outpatient Medications   Medication Sig Dispense Refill     CALCIUM CARBONATE/VITAMIN D3 (CALTRATE 600 + D ORAL) Take by mouth.       clobetasol (TEMOVATE) 0.05 % cream APPLY TO AFFECTED AREA TWICE A DAY AS NEEDED  2     EPINEPHrine (EPIPEN/ADRENACLICK/AUVI-Q) 0.3 mg/0.3 mL injection Inject 0.3 mL (0.3 mg total) as directed as needed for anaphylaxis. Inject into thigh. 2 Pre-filled Pen Syringe 0     folic acid (FOLVITE) 1 MG tablet Take 1 mg by mouth 6 (six) times a day. Take one tablet Monday-Saturday       methotrexate 2.5 MG tablet Take 2.5 mg by mouth once a week.       omega-3 fatty acids-vitamin E (FISH OIL) 1,000 mg cap Take by mouth.       prednisoLONE acetate (PRED-FORTE) 1 % ophthalmic suspension        No current facility-administered medications for this visit.         Allergies   Allergen Reactions     Shellfish Containing Products Anaphylaxis     Iodine Other (See Comments)       Patient Active Problem List   Diagnosis     Hip pain, right     Seafood allergy     Neuropathy     Lichen sclerosus       Past Medical History:   Diagnosis Date     Adhesive capsulitis of shoulder     Created by Conversion      Anemia     Created by Conversion      Incontinence   "    Leukopenia     Created by Conversion        Past Surgical History:   Procedure Laterality Date     MICRODISCECTOMY LUMBAR       [place] REPAIR BLADDER WOUND/INJ,SIMPLE      Description: Bladder Cystorrhaphy;  Recorded: 2008;       Social History     Socioeconomic History     Marital status: Single     Spouse name: Not on file     Number of children: Not on file     Years of education: Not on file     Highest education level: Not on file   Occupational History     Not on file   Social Needs     Financial resource strain: Not on file     Food insecurity     Worry: Not on file     Inability: Not on file     Transportation needs     Medical: Not on file     Non-medical: Not on file   Tobacco Use     Smoking status: Former Smoker     Packs/day: 0.00     Last attempt to quit: 1995     Years since quittin.9     Smokeless tobacco: Never Used   Substance and Sexual Activity     Alcohol use: Yes     Alcohol/week: 7.0 standard drinks     Types: 7 Glasses of wine per week     Drug use: Never     Sexual activity: Yes     Partners: Male   Lifestyle     Physical activity     Days per week: Not on file     Minutes per session: Not on file     Stress: Not on file   Relationships     Social connections     Talks on phone: Not on file     Gets together: Not on file     Attends Cheondoism service: Not on file     Active member of club or organization: Not on file     Attends meetings of clubs or organizations: Not on file     Relationship status: Not on file     Intimate partner violence     Fear of current or ex partner: Not on file     Emotionally abused: Not on file     Physically abused: Not on file     Forced sexual activity: Not on file   Other Topics Concern     Not on file   Social History Narrative     Not on file             Objective:     Vitals:    08/10/20 0822   BP: 106/78   Pulse: 71   SpO2: 98%   Weight: 111 lb 3.2 oz (50.4 kg)   Height: 5' 2.4\" (1.585 m)         Physical Exam:  General Appearance:  Alert, " cooperative, no distress  Head:  Normocephalic, no obvious abnormality  Ears: TM anatomy normal  Eyes:  PERRL, EOM's intact, conjunctiva and corneas clear bilateral cataracts left greater than right  Nose:  Nares symmetrical, septum midline, mucosa pink, no sinus tenderness  Throat:  Lips, tongue, and mucosa are moist, pink, and intact  Neck:  Supple, symmetrical, trachea midline, no adenopathy; thyroid: no enlargement, symmetric,no tenderness/mass/nodules; no carotid bruit, no JVD  Back:  Symmetrical, no curvature, ROM normal, no CVA tenderness  Chest/Breast:  No mass or tenderness  Lungs:  Clear to auscultation bilaterally, respirations unlabored   Heart:  Normal PMI, regular rate & rhythm, S1 and S2 normal, no murmurs, rubs, or gallops  Abdomen:  Soft, non-tender, bowel sounds active all four quadrants, no mass, or organomegaly  Musculoskeletal:  Tone and strength strong and symmetrical, all extremities  Lymphatic:  No adenopathy  Skin/Hair/Nails:  Skin warm, dry, and intact, no rashes  Neurologic:  Alert and oriented x3, no cranial nerve deficits, normal strength and tone, gait steady  Extremities:  No edema.  Meri's sign negative.    Genitourinary: deferred  Pulses:  Equal bilaterally    There are no Patient Instructions on file for this visit.    EKG: Normal EKG    Labs:  Labs pending at this time.  Results will be reviewed when available.    Immunization History   Administered Date(s) Administered     Hep A, Adult IM (19yr & older) 05/14/2009, 06/17/2010     Hep A, historic 05/14/2009, 06/17/2010     Influenza, Seasonal, Inj PF IIV3 10/19/2009     Influenza, inj, historic,unspecified 10/07/2015, 10/18/2017     Influenza,seasonal, Inj IIV3 11/01/2010, 10/24/2011, 10/08/2012, 10/14/2013, 10/20/2014     Td, Adult, Absorbed 08/30/2017     Td,adult,historic,unspecified 05/04/2007     Tdap 05/04/2007     ZOSTER, LIVE 07/17/2014           Electronically signed by Jose Snider MD 08/10/20 8:24 AM

## 2021-06-12 NOTE — PROGRESS NOTES
Assessment/Plan:     Patient presents today for routine physical examination.    Healthcare Maintenance: USPSTF recommendations for age 60;  Patient has been counseled on/screened for:  - intimate partner violence and there are no concerns at this time  - a healthful diet and physical activity for CVD prevention  - Diabetes and hyperlipidemia: screening was performed.   - Hep C, negative in 2019  - Asprin use: patient chose not to start  - history of smoking, patient is not eligible for the low dose chest CT today  Sexually transmitted infections: Patient would not like to be screened for chlamydia, gonorrhea, syphilis, HIV, and hepatitis  Colorectal Cancer: Cologuard ordered today  Immunizations: up to date except for shingles which was recommended  Cervical Cancer Screening: patient has been screened for cervical cancer per protocol in 2017, results were negative with negative HPV, due in 2022  Mammogram: just completed    AVS printed and given to patient.  Return to clinic in 1 year.     I have had an Advance Directives discussion with the patient.    This note has been dictated using voice recognition software. Any grammatical or context distortions are unintentional and inherent to the the software.     Roselyn Mccormick MD  Family Medicine [facility]    Subjective:     Estefany Javier is a 60 y.o. female who presents to clinic for routine physical.    Additional concerns include:    1. No major concerns. Hip pain is still present. She has been to an orthopedist and the [facility]. Patient has done acupuncture, dry needling, functional medicine and a chiropractor for the pain. Lichen sclerosis is managed with methotrexate.     PHQ-2 Score:  0    Health Care Directive: discussed    Patient Care Team:   PCP: Roselyn Mccormick     Past Medical History, Family History, and Social History reviewed.     Review of systems:  Patient endorses: nothing  The remainder of the 10 system review is otherwise  "negative.    Objective:     /70   Pulse 64   Ht 5' 2.5\" (1.588 m)   Wt 112 lb (50.8 kg)   SpO2 100%   BMI 20.16 kg/m    Gen: Alert, NAD, appears stated age, normal hygiene   Eyes: conjunctivae without injection, sclera clear, EOMI  ENT/mouth: nares clear, septum midline, absent rhinorrhea,absent pharyngeal injection, neck is supple, no thyroid enlargement  CV: RRR, no murmur appreciated, pedal edema absent bilaterally  Resp: CTAB, no wheezes, rales or ronchi  ABD: normoactive, non-tender to palpation, nondistended  MSK: grossly full range of motion in all joints, no obvious deformity  Neuro: CN II-XII grossly intact, no deficits in coordination  Psych: no apparent hallucinations or delusions, no pressured speech; alert, oriented x3  SKIN: lichen sclerosis lesions present on forearms and back  Heme/lymph: no pallor, no active bleeding/bruising, no adenopathy appreciated  Breast: nontender to palpation, no masses appreciated, no nipple discharge      Medications:  Current Outpatient Medications   Medication Sig     CALCIUM CARBONATE/VITAMIN D3 (CALTRATE 600 + D ORAL) Take by mouth.     clobetasol (TEMOVATE) 0.05 % cream APPLY TO AFFECTED AREA TWICE A DAY AS NEEDED     EPINEPHrine (EPIPEN/ADRENACLICK/AUVI-Q) 0.3 mg/0.3 mL injection Inject 0.3 mL (0.3 mg total) as directed as needed for anaphylaxis. Inject into thigh.     folic acid (FOLVITE) 1 MG tablet Take 1 mg by mouth 6 (six) times a day. Take one tablet Monday-Saturday     methotrexate 2.5 MG tablet Take 2.5 mg by mouth once a week.     prednisoLONE acetate (PRED-FORTE) 1 % ophthalmic suspension        Allergies:  Allergies   Allergen Reactions     Shellfish Containing Products Anaphylaxis     Iodine Other (See Comments)       PMH:  Past Medical History:   Diagnosis Date     Adhesive capsulitis of shoulder     Created by Conversion      Anemia     Created by Conversion      Incontinence      Leukopenia     Created by Conversion        PSH:  Past " Surgical History:   Procedure Laterality Date     MICRODISCECTOMY LUMBAR       [place] REPAIR BLADDER WOUND/INJ,SIMPLE      Description: Bladder Cystorrhaphy;  Recorded: 2008;       Family Hx:  Family History   Problem Relation Age of Onset     Dementia Mother      Prostate cancer Father      Heart disease Father      Thalassemia Sister         alpha thalassemia trait     BRCA 1/2 Neg Hx      Breast cancer Neg Hx      Cancer Neg Hx      Colon cancer Neg Hx      Endometrial cancer Neg Hx      Ovarian cancer Neg Hx        Social History:  Social History     Socioeconomic History     Marital status: Single     Spouse name: Not on file     Number of children: Not on file     Years of education: Not on file     Highest education level: Not on file   Occupational History     Not on file   Social Needs     Financial resource strain: Not on file     Food insecurity     Worry: Not on file     Inability: Not on file     Transportation needs     Medical: Not on file     Non-medical: Not on file   Tobacco Use     Smoking status: Former Smoker     Packs/day: 0.00     Quit date: 1995     Years since quittin.1     Smokeless tobacco: Never Used   Substance and Sexual Activity     Alcohol use: Yes     Alcohol/week: 7.0 standard drinks     Types: 7 Glasses of wine per week     Drug use: Never     Sexual activity: Yes     Partners: Male   Lifestyle     Physical activity     Days per week: Not on file     Minutes per session: Not on file     Stress: Not on file   Relationships     Social connections     Talks on phone: Not on file     Gets together: Not on file     Attends Protestant service: Not on file     Active member of club or organization: Not on file     Attends meetings of clubs or organizations: Not on file     Relationship status: Not on file     Intimate partner violence     Fear of current or ex partner: Not on file     Emotionally abused: Not on file     Physically abused: Not on file     Forced sexual activity:  Not on file   Other Topics Concern     Not on file   Social History Narrative     Not on file       LDL Calculated (mg/dL)   Date Value   09/30/2019 108   09/27/2018 124   08/30/2017 119        Immunization History   Administered Date(s) Administered     Hep A, Adult IM (19yr & older) 05/14/2009, 06/17/2010     Hep A, historic 05/14/2009, 06/17/2010     Influenza, Seasonal, Inj PF IIV3 10/19/2009     Influenza, inj, historic,unspecified 10/07/2015, 10/18/2017, 09/01/2020     Influenza,seasonal, Inj IIV3 11/01/2010, 10/24/2011, 10/08/2012, 10/14/2013, 10/20/2014     Td, Adult, Absorbed 08/30/2017     Td,adult,historic,unspecified 05/04/2007     Tdap 05/04/2007     ZOSTER, LIVE 07/17/2014     There are no preventive care reminders to display for this patient.      [address] [address]  Dept: [phone]  Dept Fax: [phone]  Primary Provider: Roselyn Lopez MD  Pre-op Performing Provider: ROSELYN LOPEZ    PREOPERATIVE EVALUATION:  Today's date: 10/15/2020    Estefany Javier is a 60 y.o. female who presents for a preoperative evaluation.    Surgical Information:  No flowsheet data found.  Fax number for surgical facility: [phone]  Type of Anesthesia Anticipated: to be determined    Subjective     HPI related to upcoming procedure:   Patient is having cataract surgery on right eye. Patient is having cloudy vision. She has had these symptoms for years. She has already had the left one done.     Patient does not have a Health Care Directive or Living Will: Discussed advance care planning with patient; information given to patient to review.    RX [place] ([place] reviewed:  reviewed - no concerns       Review of Systems  Constitutional, neuro, ENT, endocrine, pulmonary, cardiac, gastrointestinal, genitourinary, musculoskeletal, integument and psychiatric systems are negative, except as otherwise noted.      Patient Active Problem  List    Diagnosis Date Noted     Neuropathy 2019     Lichen sclerosus 2019     Hip pain, right 2016     Seafood allergy 2007     Past Medical History:   Diagnosis Date     Adhesive capsulitis of shoulder     Created by Conversion      Anemia     Created by Conversion      Incontinence      Leukopenia     Created by Conversion      Past Surgical History:   Procedure Laterality Date     MICRODISCECTOMY LUMBAR       [place] REPAIR BLADDER WOUND/INJ,SIMPLE      Description: Bladder Cystorrhaphy;  Recorded: 2008;     Current Outpatient Medications   Medication Sig Dispense Refill     CALCIUM CARBONATE/VITAMIN D3 (CALTRATE 600 + D ORAL) Take by mouth.       clobetasol (TEMOVATE) 0.05 % cream APPLY TO AFFECTED AREA TWICE A DAY AS NEEDED  2     EPINEPHrine (EPIPEN/ADRENACLICK/AUVI-Q) 0.3 mg/0.3 mL injection Inject 0.3 mL (0.3 mg total) as directed as needed for anaphylaxis. Inject into thigh. 2 Pre-filled Pen Syringe 0     folic acid (FOLVITE) 1 MG tablet Take 1 mg by mouth 6 (six) times a day. Take one tablet Monday-Saturday       methotrexate 2.5 MG tablet Take 2.5 mg by mouth once a week.       prednisoLONE acetate (PRED-FORTE) 1 % ophthalmic suspension        No current facility-administered medications for this visit.        Allergies   Allergen Reactions     Shellfish Containing Products Anaphylaxis     Iodine Other (See Comments)       Social History     Tobacco Use     Smoking status: Former Smoker     Packs/day: 0.00     Quit date: 1995     Years since quittin.1     Smokeless tobacco: Never Used   Substance Use Topics     Alcohol use: Yes     Alcohol/week: 7.0 standard drinks     Types: 7 Glasses of wine per week      Family History   Problem Relation Age of Onset     Dementia Mother      Prostate cancer Father      Heart disease Father      Thalassemia Sister         alpha thalassemia trait     BRCA 1/2 Neg Hx      Breast cancer Neg Hx      Cancer Neg Hx      Colon cancer Neg Hx   "    Endometrial cancer Neg Hx      Ovarian cancer Neg Hx      Social History     Substance and Sexual Activity   Drug Use Never           Objective   /70   Pulse 64   Ht 5' 2.5\" (1.588 m)   Wt 112 lb (50.8 kg)   SpO2 100%   BMI 20.16 kg/m    Physical Exam  See above    Recent Labs   Lab Test 08/10/20  0858 09/30/19  0851   HGB 12.7  --      --    NA  --  143   K 4.2 4.9   CREATININE  --  0.91        PRE-OP Diagnostics:   Labs pending at this time. Results will be reviewed when available.  No EKG required for low risk surgery (cataract, skin procedure, breast biopsy, etc).       Assessment & Plan       The proposed surgical procedure is considered LOW risk.    REVISED CARDIAC RISK INDEX   The patient has the following serious cardiovascular risks for perioperative complications:  No serious cardiac risks = 0 points    INTERPRETATION: 0 points: Class I (very low risk - 0.4% complication rate)      ICD-10-CM    1. Preop general physical exam  Z01.818 Urinalysis-UC if Indicated   2. Screen for colon cancer  Z12.11 Cologuard   3. Healthcare maintenance  Z00.00 Comprehensive Metabolic Panel     Lipid [place] FASTING   4. Senile cataract of right eye, unspecified age-related cataract type  H25.9        The patient has the following additional risks and recommendations for perioperative complications:     - No identified additional risk factors other than previously addressed     MEDICATION INSTRUCTIONS:  Patient should take no medications day of surgery.     RECOMMENDATION:  APPROVAL GIVEN to proceed with proposed procedure, without further diagnostic evaluation.    No follow-ups on file.    Signed Electronically by: Roselyn Mccormick MD    Copy of this evaluation report is provided to requesting physician.    [facility]    Revised Cardiac Risk Index  "

## 2021-06-12 NOTE — PATIENT INSTRUCTIONS - HE

## 2021-06-12 NOTE — PROGRESS NOTES
ASSESSMENT and PLAN:  1. Routine general medical examination at a health care facility  She's UTD on her cancer screenings.  She'll get Td today.  BMI is normal.  - Comprehensive Metabolic Panel    2. Screening for cervical cancer  Done today.  - Gynecologic Cytology (PAP Smear)    3. Elevated cholesterol  She's fasting for repeat labs.  - Comprehensive Metabolic Panel  - Thyroid Cascade  - Lipid Cascade    4. Family history of thalassemia  We can start w/ a heme2; if normal, then I don't think additional eval is needed at this time.  - HM2(CBC w/o Differential)      Patient Instructions   Tetanus updated today.    You can try meclizine (OTC) if you have more dizziness.  Before you travel again, let me know and we can send in the motion sickness patches.    [facility] or [facility] are gyn groups that refer people to often.    Today's labs will be available on [employer] [facility].  If you aren't signed up, then we'll mail them out.  If anything needs more immediate follow up, we'll also call.        Orders Placed This Encounter   Procedures     Td, Adult, Adsorbed (blue label)     Comprehensive Metabolic Panel     Thyroid Cascade     Lipid Cascade     Order Specific Question:   Fasting is required?     Answer:   Yes     HM2(CBC w/o Differential)     There are no discontinued medications.    No Follow-up on file.    ASSESSED PROBLEMS:  Problem List Items Addressed This Visit     None      Visit Diagnoses     Routine general medical examination at a health care facility    -  Primary    Relevant Orders    Comprehensive Metabolic Panel    Screening for cervical cancer        Relevant Orders    Gynecologic Cytology (PAP Smear)    Elevated cholesterol        Relevant Orders    Comprehensive Metabolic Panel    Thyroid Cascade    Lipid Cascade    Family history of thalassemia        Relevant Orders    HM2(CBC w/o Differential)          CHIEF COMPLAINT:  Chief Complaint   Patient presents with      "Annual Exam     pap and fasting      blood concern       HISTORY OF PRESENT ILLNESS:  Estefany Javier is a 56 y.o. female is presenting to the clinic today for an annual exam.  She is fasting.  She wants to discuss possible vertigo.  She episodes where she'll get a feeling off being \"off.\"  It happened once while flying; she'd been using her computer w/ her reading glasses on.  She got off the plane and vomited.  She had a similar but less severe case the following morning.  She had another episode where she was unsteady while standing at a co-worker's desk.  She was looking at that person's computer at the time.  She tends to have motion sickness.    She tried vaginal estrogen for vaginal dryness, but it made her feel bloated.  It continues to be an issue.  We discussed trying vaginal dilators.  She'll consider that.    Health Maintenance:  She has her eyes examined regularly and visits the dentist on a regular basis.   Mammogram: 8/25/16  Colonoscopy: 11/7/11  Pap Smear: 7/7/14; today    REVIEW OF SYSTEMS:   She denies nipple discharge and drainage.  : dribbling, painful intercourse  All other systems are negative.    PFSH:  Past Medical History:   Diagnosis Date     Adhesive capsulitis of shoulder     Created by Conversion      Anemia     Created by Conversion      Leukopenia     Created by Conversion      Past Surgical History:   Procedure Laterality Date     [place] REPAIR BLADDER WOUND/INJ,SIMPLE      Description: Bladder Cystorrhaphy;  Recorded: 05/06/2008;     Family History   Problem Relation Age of Onset     Dementia Mother      Prostate cancer Father      Heart disease Father      Thalassemia Sister      alpha thalassemia trait     BRCA 1/2 Neg Hx      Breast cancer Neg Hx      Cancer Neg Hx      Colon cancer Neg Hx      Endometrial cancer Neg Hx      Ovarian cancer Neg Hx      Social History     Social History     Marital status: Single     Spouse name: N/A     Number of children: N/A     Years of " "education: N/A     Occupational History     Not on file.     Social History Main Topics     Smoking status: Former Smoker     Quit date: 8/18/1995     Smokeless tobacco: Not on file     Alcohol use Not on file     Drug use: Not on file     Sexual activity: Not on file     Other Topics Concern     Not on file     Social History Narrative       VITALS:  Vitals:    08/30/17 0818   BP: 100/64   Patient Site: Right Arm   Patient Position: Sitting   Cuff Size: Adult Regular   Pulse: 64   Weight: 112 lb 14.4 oz (51.2 kg)   Height: 5' 2.75\" (1.594 m)     Wt Readings from Last 3 Encounters:   08/30/17 112 lb 14.4 oz (51.2 kg)   08/18/16 113 lb 6.4 oz (51.4 kg)   08/18/15 114 lb (51.7 kg)       PHYSICAL EXAM:  Constitutional:  Reveals an alert, pleasant adult female.   Vitals:  Noted.   Head: Normocephalic, without obvious abnormality, atraumatic   Ears: TM's normal bilaterally   Eyes: PERRL, conjunctiva/corneas clear, EOM's intact   Throat: Lips, mucosa, and tongue normal; teeth and gums normal   Neck: Normal ROM, no carotid bruits, no thyromegaly   Lungs: Clear to auscultation bilaterally, respirations unlabored.   Breast exam:  Normal skin overlying her breasts bilaterally no discrete nodule is palpable in the axilla bilaterally  Heart: Regular rate and rhythm, S1 and S2 normal, no murmur, rub, or gallop,   Abdomen: Soft, non-tender, bowel sounds active all four quadrants, no masses, no organomegaly   Extremities: Extremities normal, atraumatic, no cyanosis or edema   Pelvic:Normally developed genitalia with no external lesions or eruptions. Vagina and cervix are pale, tight, she had discomfort w speculum exam (white handle), no inflammation, discharge. No cystocele, No rectocele. Uterus firm, non-tender.  No adnexal mass or tenderness.  Skin: Skin color, texture, turgor normal, no rashes or lesions   Neurologic: Normal     MEDICATIONS:  Current Outpatient Prescriptions   Medication Sig Dispense Refill     CALCIUM " CARBONATE/VITAMIN D3 (CALTRATE 600 + D ORAL) Take by mouth.       omega-3 fatty acids-vitamin E (FISH OIL) 1,000 mg cap Take by mouth.       No current facility-administered medications for this visit.

## 2021-06-13 NOTE — PROGRESS NOTES
Assessment and Plan:     1. Rib contusion, left, initial encounter  XR Ribs Left W PA Chest   2. Left flank pain  Urinalysis-UC if Indicated   3. Hematuria, unspecified type       Rib x-rays show no obvious fracture.  Will have radiology review.  Discussed symptomatic treatment including rest, ice.  Offered prescription for pain management, but she declines.  She will continue taking over-the-counter ibuprofen.  She could certainly consider taking acetaminophen as well.  Preliminary urinalysis shows blood.  Will obtain urine micro and culture.  If micro confirms blood, may consider CT stone run to further evaluate for nephrolithiasis.  She is content with the plan.  I encouraged follow-up with Dr. Mccormick if symptoms persist or worsen.    Subjective:     Estefany Lira is a 60 y.o. female presenting to the clinic for concerns for left flank pain.  On Friday, patient states her foot got caught in her jeans while she was taking them off.  She tripped and fell and landed on a william basket.  Patient states she woke up with pain within her left flank region.  She has had difficulty breathing due to the pain.  Walking up and down stairs exacerbates the pain.  She has a history of nephrolithiasis.  She had difficulty sleeping last night due to pain with rolling over.  She had to sit in the chair.  Coughing and sneezing is painful.  She has been taking Advil.  She denies numbness and tingling of her extremities.  She has not noticed any erythema, swelling, bruising.  She has not had dysuria, hematuria, fever, urinary urgency or frequency.    Review of Systems: A complete 14 point review of systems was obtained and is negative or as stated in the history of present illness.    Social History     Socioeconomic History     Marital status: Single     Spouse name: Not on file     Number of children: Not on file     Years of education: Not on file     Highest education level: Not on file   Occupational History     Not on file   Social  Needs     Financial resource strain: Not on file     Food insecurity     Worry: Not on file     Inability: Not on file     Transportation needs     Medical: Not on file     Non-medical: Not on file   Tobacco Use     Smoking status: Former Smoker     Packs/day: 0.00     Quit date: 1995     Years since quittin.3     Smokeless tobacco: Never Used   Substance and Sexual Activity     Alcohol use: Yes     Alcohol/week: 7.0 standard drinks     Types: 7 Glasses of wine per week     Drug use: Never     Sexual activity: Yes     Partners: Male   Lifestyle     Physical activity     Days per week: Not on file     Minutes per session: Not on file     Stress: Not on file   Relationships     Social connections     Talks on phone: Not on file     Gets together: Not on file     Attends Rastafari service: Not on file     Active member of club or organization: Not on file     Attends meetings of clubs or organizations: Not on file     Relationship status: Not on file     Intimate partner violence     Fear of current or ex partner: Not on file     Emotionally abused: Not on file     Physically abused: Not on file     Forced sexual activity: Not on file   Other Topics Concern     Not on file   Social History Narrative     Not on file       Active Ambulatory Problems     Diagnosis Date Noted     Hip pain, right 2016     Seafood allergy 2007     Neuropathy 2019     Lichen sclerosus 2019     Resolved Ambulatory Problems     Diagnosis Date Noted     Adhesive capsulitis of shoulder      Anemia      Acute Sinusitis      Leukopenia      Arthralgias In Multiple Sites      Joint Pain, Localized In The Hip      Lumbar Radiculopathy      Past Medical History:   Diagnosis Date     Incontinence      Leukopenia        Family History   Problem Relation Age of Onset     Dementia Mother      Prostate cancer Father      Heart disease Father      Thalassemia Sister         alpha thalassemia trait     BRCA 1/2 Neg Hx       Breast cancer Neg Hx      Cancer Neg Hx      Colon cancer Neg Hx      Endometrial cancer Neg Hx      Ovarian cancer Neg Hx        Objective:     /64 (Patient Site: Right Arm, Patient Position: Sitting, Cuff Size: Adult Regular)   Pulse 71   Wt 114 lb 9.6 oz (52 kg)   SpO2 99%   BMI 20.63 kg/m      Patient is alert, in no obvious distress.   Skin: Warm, dry.    Lungs:  Clear to auscultation. Respirations even and unlabored.  No wheezing or rales noted.   Heart:  Regular rate and rhythm.  No murmurs.   Abdomen: Soft, nontender.  No organomegaly. Bowel sounds normoactive. No guarding or masses noted.   Musculoskeletal:  Full ROM of extremities.  She is tender to palpation of her left lower posterior rib cage.  There are no areas of erythema, ecchymosis, signs of trauma.    I ordered and personally reviewed left rib x-rays showing no obvious fracture.  Will have radiology review.

## 2021-06-13 NOTE — PROGRESS NOTES
"Clinic Note    Assessment:     Assessment and Plan:    1. Rash of body    This does not look like eczema or fungal in nature to me. I told the patient that I would feel comfortable with her seeing a dermatologist before I attempt to treat it.     - Ambulatory referral to Dermatology     Patient Instructions   Someone will call you in 24-48 hours to schedule the dermatology appointment.              Subjective:      Estefany Javier is a 57 y.o. female who comes to the clinic with a rash.     She says that she first noticed the rash about six months ago near her groin. She says that the rash is a \"deadening of the skin, kind of like a callus.\" She says that she notices another patch near her shoulder three months ago, and then another near her abdomen and her chest a couple of days ago. No new fevers or chills. Patches do not itch. She feels like the patch near her groin is getting bigger. The do not hurt. She has tried using lotion on the patches but it has not helped much. No other hx of dermatological. No family history of skin cancer.       The following portions of the patient's history were reviewed and updated as appropriate: Allergies, Problem List, Medications.     Review of Systems:    Review is negative except for what is mentioned above.     Social Hx:    History   Smoking Status     Former Smoker     Quit date: 8/18/1995   Smokeless Tobacco     Not on file         Objective:     Vitals:    10/17/17 1525   BP: 120/62   Patient Site: Right Arm   Patient Position: Sitting   Cuff Size: Adult Regular   Pulse: 64   SpO2: 100%   Weight: 116 lb 4.8 oz (52.8 kg)       Exam:    General: No apparent distress. Calm. Alert and Oriented X3. Pt behavior is appropriate.  Skin: Silvery patches present on left inguinal, right shoulder, and sternum. No erythema or swelling present. Patches range from 2-4 cm in length. No central clearing. Irregular shapes.        Patient Active Problem List   Diagnosis     Arthralgias In " Multiple Sites     Current Outpatient Prescriptions   Medication Sig Dispense Refill     CALCIUM CARBONATE/VITAMIN D3 (CALTRATE 600 + D ORAL) Take by mouth.       omega-3 fatty acids-vitamin E (FISH OIL) 1,000 mg cap Take by mouth.       No current facility-administered medications for this visit.        Total time spent with patient was 15 minutes with >50% of time spent in face-to-face counseling regarding the above plan       Piero Ruiz CNP (Rob)    10/17/2017

## 2021-06-20 NOTE — PROGRESS NOTES
ASSESSMENT and PLAN:  1. Routine general medical examination at a health care facility  She has a flu shot scheduled at work.  Advanced care paperwork given to her.  Cancer screens are UTD.  BMI  Is nl.  She'll f/u as needed regarding life stress.  - Comprehensive Metabolic Panel    2. Mixed hyperlipidemia  She's fasting today.  I'm repeating her labs for monitoring.  - Comprehensive Metabolic Panel  - Lipid Cascade      Patient Instructions   Let me know if you want a referral for therapy or want to discuss medication options.  We can also fill out FMLA or other paperwork as needed.    Today's labs will be available on [employer].  If you aren't signed up, then we'll mail them out.  If anything needs more immediate follow up, we'll also call.    Take care!      Orders Placed This Encounter   Procedures     Comprehensive Metabolic Panel     Lipid Cascade     Order Specific Question:   Fasting is required?     Answer:   Yes     Medications Discontinued During This Encounter   Medication Reason     EPINEPHrine (EPIPEN/ADRENACLICK/AUVI-Q) 0.3 mg/0.3 mL injection Reorder       No Follow-up on file.    ASSESSED PROBLEMS:  Problem List Items Addressed This Visit     None      Visit Diagnoses     Routine general medical examination at a health care facility    -  Primary    Relevant Orders    Comprehensive Metabolic Panel    Mixed hyperlipidemia        Relevant Orders    Comprehensive Metabolic Panel    Lipid Cascade          CHIEF COMPLAINT:  Chief Complaint   Patient presents with     Annual Exam     Fasting- No concerns but stressed lately       HISTORY OF PRESENT ILLNESS:  Estefany Javier is a 58 y.o. female is presenting to the clinic today for an annual exam.     She has been under some stress this past year in regards to her parents and her job.  Her mother is 92 and was moved into a memory care facility this past June.  The following week, they had to put 1 of the dogs down.  Her father is still living alone  independently.  He has Parkinson's.  He also has a tumor on his kidney.  It may be cancerous.  Additionally, work has been very stressful.  She does not feel that her boss has been very understanding of her wanting to be more available to help out her parents.  She has considered looking into taking a leave.  She has been in connection with her HR.    Health Maintenance:  She has her eyes examined regularly and visits the dentist on a regular basis.   Mammogram: 8/30/2018  Colonoscopy: 1/17/2011, every 10  Pap Smear: 8/30/2017 with co-testing    REVIEW OF SYSTEMS:   Aside from HPI,   All other systems are negative.    PFSH:  Past Medical History:   Diagnosis Date     Adhesive capsulitis of shoulder     Created by Conversion      Anemia     Created by Conversion      Leukopenia     Created by Conversion      Past Surgical History:   Procedure Laterality Date     SD REPAIR BLADDER WOUND/INJ,SIMPLE      Description: Bladder Cystorrhaphy;  Recorded: 05/06/2008;     Family History   Problem Relation Age of Onset     Dementia Mother      Prostate cancer Father      Heart disease Father      Thalassemia Sister      alpha thalassemia trait     BRCA 1/2 Neg Hx      Breast cancer Neg Hx      Cancer Neg Hx      Colon cancer Neg Hx      Endometrial cancer Neg Hx      Ovarian cancer Neg Hx      Social History     Social History     Marital status: Single     Spouse name: N/A     Number of children: N/A     Years of education: N/A     Occupational History     Not on file.     Social History Main Topics     Smoking status: Former Smoker     Quit date: 8/18/1995     Smokeless tobacco: Never Used     Alcohol use Not on file     Drug use: Not on file     Sexual activity: Not on file     Other Topics Concern     Not on file     Social History Narrative       VITALS:  Vitals:    09/27/18 0816   BP: 120/76   Patient Site: Right Arm   Patient Position: Sitting   Cuff Size: Adult Regular   Pulse: 60   Weight: 111 lb 9.6 oz (50.6 kg)  "  Height: 5' 2.8\" (1.595 m)     Wt Readings from Last 3 Encounters:   09/27/18 111 lb 9.6 oz (50.6 kg)   10/17/17 116 lb 4.8 oz (52.8 kg)   08/30/17 112 lb 14.4 oz (51.2 kg)     PHYSICAL EXAM:  Constitutional:  Reveals an alert, pleasant adult female.   Vitals:  Noted.   Head: Normocephalic, without obvious abnormality, atraumatic   Ears: TM's normal bilaterally   Eyes: PERRL, conjunctiva/corneas clear, EOM's intact   Throat: Lips, mucosa, and tongue normal; teeth and gums normal   Neck: Normal ROM, no carotid bruits, no thyromegaly   Lungs: Clear to auscultation bilaterally, respirations unlabored.   Breast exam:  Normal skin overlying her breasts bilaterally no discrete nodule is palpable in the axilla bilaterally  Heart: Regular rate and rhythm, S1 and S2 normal, no murmur, rub, or gallop,   Abdomen: Soft, non-tender, bowel sounds active all four quadrants, no masses, no organomegaly   Extremities: Extremities normal, atraumatic, no cyanosis or edema   Pelvic:Not examined  Skin: Skin color, texture, turgor normal, no rashes or lesions   Neurologic: Normal     MEDICATIONS:  Current Outpatient Prescriptions   Medication Sig Dispense Refill     CALCIUM CARBONATE/VITAMIN D3 (CALTRATE 600 + D ORAL) Take by mouth.       EPINEPHrine (EPIPEN/ADRENACLICK/AUVI-Q) 0.3 mg/0.3 mL injection Inject 0.3 mL (0.3 mg total) as directed as needed for anaphylaxis. 2 Pre-filled Pen Syringe 0     omega-3 fatty acids-vitamin E (FISH OIL) 1,000 mg cap Take by mouth.       No current facility-administered medications for this visit.          "

## 2021-07-08 ENCOUNTER — RECORDS - HEALTHEAST (OUTPATIENT)
Dept: ADMINISTRATIVE | Facility: OTHER | Age: 61
End: 2021-07-08

## 2021-07-13 ENCOUNTER — RECORDS - HEALTHEAST (OUTPATIENT)
Dept: ADMINISTRATIVE | Facility: CLINIC | Age: 61
End: 2021-07-13

## 2021-07-21 ENCOUNTER — RECORDS - HEALTHEAST (OUTPATIENT)
Dept: ADMINISTRATIVE | Facility: CLINIC | Age: 61
End: 2021-07-21

## 2021-09-14 ENCOUNTER — ANCILLARY PROCEDURE (OUTPATIENT)
Dept: MAMMOGRAPHY | Facility: CLINIC | Age: 61
End: 2021-09-14
Attending: FAMILY MEDICINE
Payer: COMMERCIAL

## 2021-09-14 DIAGNOSIS — Z12.31 VISIT FOR SCREENING MAMMOGRAM: ICD-10-CM

## 2021-09-14 PROCEDURE — 77067 SCR MAMMO BI INCL CAD: CPT | Mod: TC | Performed by: RADIOLOGY

## 2021-09-18 ENCOUNTER — HEALTH MAINTENANCE LETTER (OUTPATIENT)
Age: 61
End: 2021-09-18

## 2021-10-14 ENCOUNTER — TRANSFERRED RECORDS (OUTPATIENT)
Dept: HEALTH INFORMATION MANAGEMENT | Facility: CLINIC | Age: 61
End: 2021-10-14

## 2021-10-28 ENCOUNTER — OFFICE VISIT (OUTPATIENT)
Dept: FAMILY MEDICINE | Facility: CLINIC | Age: 61
End: 2021-10-28
Payer: COMMERCIAL

## 2021-10-28 VITALS
BODY MASS INDEX: 19.49 KG/M2 | HEART RATE: 61 BPM | OXYGEN SATURATION: 98 % | DIASTOLIC BLOOD PRESSURE: 72 MMHG | HEIGHT: 63 IN | WEIGHT: 110 LBS | SYSTOLIC BLOOD PRESSURE: 100 MMHG

## 2021-10-28 DIAGNOSIS — Z00.00 HEALTHCARE MAINTENANCE: Primary | ICD-10-CM

## 2021-10-28 DIAGNOSIS — R31.9 HEMATURIA, UNSPECIFIED TYPE: ICD-10-CM

## 2021-10-28 DIAGNOSIS — D64.9 ANEMIA, UNSPECIFIED TYPE: ICD-10-CM

## 2021-10-28 PROBLEM — L90.0 LICHEN SCLEROSUS: Status: ACTIVE | Noted: 2019-09-30

## 2021-10-28 PROBLEM — G62.9 NEUROPATHY: Status: ACTIVE | Noted: 2019-09-30

## 2021-10-28 LAB
ALBUMIN SERPL-MCNC: 4.1 G/DL (ref 3.5–5)
ALBUMIN UR-MCNC: NEGATIVE MG/DL
ALP SERPL-CCNC: 94 U/L (ref 45–120)
ALT SERPL W P-5'-P-CCNC: 28 U/L (ref 0–45)
ANION GAP SERPL CALCULATED.3IONS-SCNC: 11 MMOL/L (ref 5–18)
APPEARANCE UR: CLEAR
AST SERPL W P-5'-P-CCNC: 42 U/L (ref 0–40)
BACTERIA #/AREA URNS HPF: ABNORMAL /HPF
BILIRUB SERPL-MCNC: 0.5 MG/DL (ref 0–1)
BILIRUB UR QL STRIP: NEGATIVE
BUN SERPL-MCNC: 23 MG/DL (ref 8–22)
CALCIUM SERPL-MCNC: 9.9 MG/DL (ref 8.5–10.5)
CHLORIDE BLD-SCNC: 104 MMOL/L (ref 98–107)
CHOLEST SERPL-MCNC: 263 MG/DL
CO2 SERPL-SCNC: 25 MMOL/L (ref 22–31)
COLOR UR AUTO: YELLOW
CREAT SERPL-MCNC: 0.87 MG/DL (ref 0.6–1.1)
ERYTHROCYTE [DISTWIDTH] IN BLOOD BY AUTOMATED COUNT: 15.6 % (ref 10–15)
FASTING STATUS PATIENT QL REPORTED: YES
FERRITIN SERPL-MCNC: 20 NG/ML (ref 10–130)
GFR SERPL CREATININE-BSD FRML MDRD: 72 ML/MIN/1.73M2
GLUCOSE BLD-MCNC: 92 MG/DL (ref 70–125)
GLUCOSE UR STRIP-MCNC: NEGATIVE MG/DL
HCT VFR BLD AUTO: 38.7 % (ref 35–47)
HDLC SERPL-MCNC: 131 MG/DL
HGB BLD-MCNC: 12.8 G/DL (ref 11.7–15.7)
HGB UR QL STRIP: ABNORMAL
IRON SATN MFR SERPL: 11 % (ref 15–46)
IRON SERPL-MCNC: 47 UG/DL (ref 35–180)
KETONES UR STRIP-MCNC: NEGATIVE MG/DL
LDLC SERPL CALC-MCNC: 123 MG/DL
LEUKOCYTE ESTERASE UR QL STRIP: NEGATIVE
MCH RBC QN AUTO: 31.1 PG (ref 26.5–33)
MCHC RBC AUTO-ENTMCNC: 33.1 G/DL (ref 31.5–36.5)
MCV RBC AUTO: 94 FL (ref 78–100)
MUCOUS THREADS #/AREA URNS LPF: PRESENT /LPF
NITRATE UR QL: NEGATIVE
PH UR STRIP: 6 [PH] (ref 5–8)
PLATELET # BLD AUTO: 309 10E3/UL (ref 150–450)
POTASSIUM BLD-SCNC: 4.9 MMOL/L (ref 3.5–5)
PROT SERPL-MCNC: 7.1 G/DL (ref 6–8)
RBC # BLD AUTO: 4.11 10E6/UL (ref 3.8–5.2)
RBC #/AREA URNS AUTO: ABNORMAL /HPF
SODIUM SERPL-SCNC: 140 MMOL/L (ref 136–145)
SP GR UR STRIP: 1.02 (ref 1–1.03)
SQUAMOUS #/AREA URNS AUTO: ABNORMAL /LPF
TIBC SERPL-MCNC: 444 UG/DL (ref 240–430)
TRIGL SERPL-MCNC: 45 MG/DL
UROBILINOGEN UR STRIP-ACNC: 0.2 E.U./DL
WBC # BLD AUTO: 3.8 10E3/UL (ref 4–11)
WBC #/AREA URNS AUTO: ABNORMAL /HPF

## 2021-10-28 PROCEDURE — 85027 COMPLETE CBC AUTOMATED: CPT | Performed by: FAMILY MEDICINE

## 2021-10-28 PROCEDURE — 80053 COMPREHEN METABOLIC PANEL: CPT | Performed by: FAMILY MEDICINE

## 2021-10-28 PROCEDURE — 83550 IRON BINDING TEST: CPT | Performed by: FAMILY MEDICINE

## 2021-10-28 PROCEDURE — 81001 URINALYSIS AUTO W/SCOPE: CPT | Performed by: FAMILY MEDICINE

## 2021-10-28 PROCEDURE — 90686 IIV4 VACC NO PRSV 0.5 ML IM: CPT | Performed by: FAMILY MEDICINE

## 2021-10-28 PROCEDURE — 90471 IMMUNIZATION ADMIN: CPT | Performed by: FAMILY MEDICINE

## 2021-10-28 PROCEDURE — 82728 ASSAY OF FERRITIN: CPT | Performed by: FAMILY MEDICINE

## 2021-10-28 PROCEDURE — 99396 PREV VISIT EST AGE 40-64: CPT | Mod: 25 | Performed by: FAMILY MEDICINE

## 2021-10-28 PROCEDURE — 36415 COLL VENOUS BLD VENIPUNCTURE: CPT | Performed by: FAMILY MEDICINE

## 2021-10-28 PROCEDURE — 80061 LIPID PANEL: CPT | Performed by: FAMILY MEDICINE

## 2021-10-28 RX ORDER — EPINEPHRINE 0.3 MG/.3ML
0.3 INJECTION SUBCUTANEOUS
COMMUNITY
Start: 2020-12-01 | End: 2021-12-21

## 2021-10-28 ASSESSMENT — MIFFLIN-ST. JEOR: SCORE: 1025.15

## 2021-10-28 NOTE — PROGRESS NOTES
Assessment/Plan:     Patient presents today for routine physical examination.    Healthcare Maintenance: USPSTF recommendations for age 61;  Patient has been counseled on/screened for:  - intimate partner violence and there are no concerns at this time  - a healthful diet and physical activity for CVD prevention  - Diabetes and hyperlipidemia: screening was performed.   - Hep C, negative in 2019  - Asprin use: patient chose not to start  Sexually transmitted infections: Patient would not like to be screened for chlamydia, gonorrhea, syphilis, HIV, and hepatitis  Colorectal Cancer: Cologuard last performed in 2020, due in 2023   Immunizations: up to date except for influenza which was recommended  Cervical Cancer Screening: patient has been screened for cervical cancer per protocol in 2017, results were negative with negative HPV, due in 2022  Mammogram: performed in 2021      Additional concerns are as detailed below:    1. Anemia, unspecified type  Patient is been told when she attempts to donate blood that her hemoglobin is too low, and her dermatologist would like this reviewed.  Patient has no symptoms and is able to play 112 rounds of golf last season. In addition, pt's sister has a history of thalassemia trait.   - CBC with platelets; Future  - Iron and iron binding capacity; Future  - Ferritin; Future    2. Hematuria, unspecified type  - UA Macro with Reflex to Micro and Culture - lab collect; Future    3. Healthcare maintenance  - Comprehensive metabolic panel (BMP + Alb, Alk Phos, ALT, AST, Total. Bili, TP)  - Lipid panel reflex to direct LDL Fasting      AVS printed and given to patient.  Return to clinic in 1 year.     I have had an Advance Directives discussion with the patient.    This note has been dictated using voice recognition software. Any grammatical or context distortions are unintentional and inherent to the the software.     Roselyn Mccormick MD  [facility]  "Clinic    Subjective:     Estefany Javier is a 61 year old female who presents to clinic for routine physical.    Additional concerns include:    1. No concerns.     PHQ-2 Score:  0    Health Care Directive: discussed    Patient Care Team:   PCP: Roselyn Mccormick     Past Medical History, Family History, and Social History reviewed.    Review of systems:  Patient endorses: none  The remainder of the 10 system review is otherwise negative.    Objective:     /72   Pulse 61   Ht 1.588 m (5' 2.5\")   Wt 49.9 kg (110 lb)   SpO2 98%   BMI 19.80 kg/m    Gen: Alert, NAD, appears stated age, normal hygiene   Eyes: conjunctivae without injection, sclera clear, EOMI  ENT/mouth: nares clear, septum midline, absent rhinorrhea,absent pharyngeal injection, neck is supple, no thyroid enlargement  CV: RRR, no murmur appreciated, pedal edema absent bilaterally  Resp: CTAB, no wheezes, rales or ronchi  ABD: normoactive, non-tender to palpation, nondistended  MSK: grossly full range of motion in all joints, no obvious deformity  Neuro: CN II-XII grossly intact, no deficits in coordination  Psych: no apparent hallucinations or delusions, no pressured speech; alert, oriented x3  SKIN: dry and without lesions  Heme/lymph: no pallor, no active bleeding/bruising, no adenopathy appreciated    Medications:  Current Outpatient Medications   Medication     EPINEPHrine (ANY BX GENERIC EQUIV) 0.3 MG/0.3ML injection 2-pack     folic acid 800 MCG tablet     methotrexate 2.5 MG tablet     No current facility-administered medications for this visit.       Allergies:  Allergies   Allergen Reactions     Shellfish-Derived Products Anaphylaxis     Iodine Other (See Comments)       PMH:  Past Medical History:   Diagnosis Date     Adhesive capsulitis of shoulder     Created by Conversion      Anemia, unspecified     Created by Conversion      Incontinence      Leukocytopenia, unspecified     Created by Conversion        PSH:  Past Surgical " History:   Procedure Laterality Date     C REPAIR BLADDER WOUND/INJ,SIMPLE      Description: Bladder Cystorrhaphy;  Recorded: 05/06/2008;     MICRODISCECTOMY LUMBAR         Family Hx:  Family History   Problem Relation Age of Onset     Dementia Mother      Prostate Cancer Father      Heart Disease Father      Thalassemia Sister         alpha thalassemia trait     Hereditary Breast and Ovarian Cancer Syndrome No family hx of      Breast Cancer No family hx of      Cancer No family hx of      Colon Cancer No family hx of      Endometrial Cancer No family hx of      Ovarian Cancer No family hx of        Social History:  Social History     Socioeconomic History     Marital status: Single     Spouse name: Not on file     Number of children: Not on file     Years of education: Not on file     Highest education level: Not on file   Occupational History     Not on file   Tobacco Use     Smoking status: Never Smoker     Smokeless tobacco: Never Used   Substance and Sexual Activity     Alcohol use: Not on file     Drug use: Not on file     Sexual activity: Not on file   Other Topics Concern     Not on file   Social History Narrative     Not on file     Social Determinants of Health     Financial Resource Strain:      Difficulty of Paying Living Expenses:    Food Insecurity:      Worried About Running Out of Food in the Last Year:      Ran Out of Food in the Last Year:    Transportation Needs:      Lack of Transportation (Medical):      Lack of Transportation (Non-Medical):    Physical Activity:      Days of Exercise per Week:      Minutes of Exercise per Session:    Stress:      Feeling of Stress :    Social Connections:      Frequency of Communication with Friends and Family:      Frequency of Social Gatherings with Friends and Family:      Attends Baptism Services:      Active Member of Clubs or Organizations:      Attends Club or Organization Meetings:      Marital Status:    Intimate Partner Violence:      Fear of  Current or Ex-Partner:      Emotionally Abused:      Physically Abused:      Sexually Abused:        No components found for: LDLCALC     Immunization History   Administered Date(s) Administered     COVID-19,PF,Pfizer 03/22/2021, 04/19/2021     FLU 6-35 months 10/19/2009     Flu, Unspecified 10/07/2015, 10/18/2017, 09/01/2020     HepA, Unspecified 05/14/2009, 06/17/2010     HepA-Adult 05/14/2009, 06/17/2010     Influenza (IIV3) PF 11/01/2010, 10/24/2011, 10/08/2012, 10/14/2013, 10/20/2014     Influenza Vaccine, 6+MO IM (QUADRIVALENT W/PRESERVATIVES) 10/07/2015, 10/18/2017, 10/10/2018, 10/01/2019, 09/01/2020     TDAP Vaccine (Adacel) 05/04/2007     Td (Adult), Adsorbed 08/30/2017     Td,adult,historic,unspecified 05/04/2007     Tdap (Adacel,Boostrix) 05/04/2007     Zoster vaccine, live 07/17/2014         Answers for HPI/ROS submitted by the patient on 10/28/2021  Frequency of exercise:: 4-5 days/week  Getting at least 3 servings of Calcium per day:: Yes  Diet:: Regular (no restrictions)  Taking medications regularly:: Yes  Medication side effects:: None  Bi-annual eye exam:: Yes  Dental care twice a year:: Yes  Sleep apnea or symptoms of sleep apnea:: None  Additional concerns today:: Yes  Duration of exercise:: Other

## 2021-12-21 ENCOUNTER — MYC MEDICAL ADVICE (OUTPATIENT)
Dept: FAMILY MEDICINE | Facility: CLINIC | Age: 61
End: 2021-12-21
Payer: COMMERCIAL

## 2021-12-21 DIAGNOSIS — Z91.013 SEAFOOD ALLERGY: Primary | ICD-10-CM

## 2021-12-21 RX ORDER — EPINEPHRINE 0.3 MG/.3ML
0.3 INJECTION SUBCUTANEOUS ONCE
Qty: 0.3 ML | Refills: 0 | Status: SHIPPED | OUTPATIENT
Start: 2021-12-21 | End: 2021-12-27

## 2021-12-27 RX ORDER — EPINEPHRINE 0.3 MG/.3ML
0.3 INJECTION SUBCUTANEOUS ONCE
Qty: 0.3 ML | Refills: 0 | Status: SHIPPED | OUTPATIENT
Start: 2021-12-27 | End: 2021-12-27

## 2022-02-22 ENCOUNTER — APPOINTMENT (OUTPATIENT)
Dept: RADIOLOGY | Facility: CLINIC | Age: 62
End: 2022-02-22
Payer: COMMERCIAL

## 2022-02-22 ENCOUNTER — HOSPITAL ENCOUNTER (EMERGENCY)
Facility: CLINIC | Age: 62
Discharge: HOME | End: 2022-02-22
Admitting: PHYSICIAN ASSISTANT
Payer: COMMERCIAL

## 2022-02-22 VITALS
HEIGHT: 63 IN | OXYGEN SATURATION: 100 % | SYSTOLIC BLOOD PRESSURE: 156 MMHG | DIASTOLIC BLOOD PRESSURE: 92 MMHG | BODY MASS INDEX: 19.84 KG/M2 | WEIGHT: 112 LBS | RESPIRATION RATE: 16 BRPM | TEMPERATURE: 98 F | HEART RATE: 77 BPM

## 2022-02-22 DIAGNOSIS — S63.502A WRIST SPRAIN, LEFT, INITIAL ENCOUNTER: ICD-10-CM

## 2022-02-22 PROCEDURE — 250N000011 HC RX IP 250 OP 636: Performed by: PHYSICIAN ASSISTANT

## 2022-02-22 PROCEDURE — 73110 X-RAY EXAM OF WRIST: CPT | Mod: LT

## 2022-02-22 PROCEDURE — 73090 X-RAY EXAM OF FOREARM: CPT | Mod: LT

## 2022-02-22 PROCEDURE — 99285 EMERGENCY DEPT VISIT HI MDM: CPT

## 2022-02-22 PROCEDURE — 73130 X-RAY EXAM OF HAND: CPT | Mod: LT

## 2022-02-22 PROCEDURE — 250N000013 HC RX MED GY IP 250 OP 250 PS 637: Performed by: PHYSICIAN ASSISTANT

## 2022-02-22 RX ORDER — IBUPROFEN 800 MG/1
800 TABLET, FILM COATED ORAL ONCE
Status: COMPLETED | OUTPATIENT
Start: 2022-02-22 | End: 2022-02-22

## 2022-02-22 RX ORDER — ACETAMINOPHEN 500 MG
1000 TABLET ORAL ONCE
Status: COMPLETED | OUTPATIENT
Start: 2022-02-22 | End: 2022-02-22

## 2022-02-22 RX ORDER — ONDANSETRON 4 MG/1
4 TABLET, ORALLY DISINTEGRATING ORAL ONCE
Status: COMPLETED | OUTPATIENT
Start: 2022-02-22 | End: 2022-02-22

## 2022-02-22 RX ADMIN — IBUPROFEN 800 MG: 800 TABLET, FILM COATED ORAL at 18:19

## 2022-02-22 RX ADMIN — ONDANSETRON 4 MG: 4 TABLET, ORALLY DISINTEGRATING ORAL at 18:18

## 2022-02-22 RX ADMIN — ACETAMINOPHEN 1000 MG: 500 TABLET ORAL at 18:19

## 2022-02-22 ASSESSMENT — ENCOUNTER SYMPTOMS
CHEST TIGHTNESS: 0
SPEECH DIFFICULTY: 0
VOMITING: 0
MYALGIAS: 0
TREMORS: 0
LIGHT-HEADEDNESS: 1
HEADACHES: 0
CONFUSION: 0
AGITATION: 0
ADENOPATHY: 0
COLOR CHANGE: 0
NAUSEA: 1
ARTHRALGIAS: 1
ABDOMINAL PAIN: 0
DIZZINESS: 0
WOUND: 0

## 2022-02-22 NOTE — ED TRIAGE NOTES
"Pt arrives to ED with c/o left wrist pain from a fall outside that happened around 1655 tonight. Pt states her neighbor was helping her and said she \"passed out\". Pt does not recalling hitting head and does not feel any lumps but states she does pass out, get dizzy and nauseated with pain. Denies any of those symptoms right now. Alert and oriented. Not on blood thinners.   "

## 2022-02-23 NOTE — ED NOTES
Introduced self to patient and whiteboard updated.  Hourly rounding explained.  Call light in reach.  Plan of care and expected length of stay explained.  Will continue to monitor.  CMS to left wrist intact.  Placed wrist on a pillow and ice given.

## 2022-02-23 NOTE — DISCHARGE INSTRUCTIONS
You were seen in the emergency department for a fall on your outstretched wrist.  Thankfully, the x-rays of your hand, wrist and forearm do not show any fracture.  You do have some tenderness over the bone called the scaphoid, occasionally this does not show itself is broken right away and you need repeat imaging for reevaluation.    We are going to put you in the thumb spica splint.  You should wear this throughout the day and at night.  I do recommend gentle range of motion of the fingers and elbow.  Continue to use ibuprofen and Tylenol for pain control.  Applying ice can also be helpful.    You should follow-up with either [facility] or your primary care provider in 1 to 2 weeks for hospital recheck.  They will consider doing repeat x-rays.    Tylenol dosing:  Take 500 to 1000 mg of Tylenol every 6 hours as needed.  Do not exceed 4000 mg of Tylenol in 24 hours from all medication sources.  It is important to check other medications that you might be taking (like DayQuil/NyQuil) as these may also contain Tylenol (acetaminophen).     Ibuprofen dosing:  Take 600 800 mg of ibuprofen every 6 hours as needed.  Do not exceed 3200 mg of ibuprofen in 24 hours.  It is safe to take Tylenol and ibuprofen together.

## 2022-02-23 NOTE — ED PROVIDER NOTES
EMERGENCY DEPARTMENT ENCOUNTER      NAME: Estefany Javier  AGE: 61 year old female  YOB: 1960  MRN: [id number]  EVALUATION DATE & TIME: 2/22/2022  5:38 PM    PCP: Roselyn Mccormick    ED PROVIDER: Sally Craig PA-C      Chief Complaint   Patient presents with     Fall         FINAL IMPRESSION:  1. Wrist sprain, left, initial encounter          MEDICAL DECISION MAKING:    Pertinent Labs & Imaging studies reviewed. (See chart for details)  61 year old otherwise healthy female presents to the Emergency Department for evaluation of left wrist pain after a fall on outstretched hand when she slipped on ice.  Denies hitting her head.  She did have a vasovagal episode in response to the pain which occurs for her frequently.  On exam she is alert, nontoxic-appearing in no acute distress.  Vital signs are notable for elevated blood pressure with systolic into the 150s.  She does have tenderness to palpation at the ulnar aspect of the left hand over the fifth metacarpal.  There is also some scaphoid tenderness and tenderness to palpation along the proximal ulna.  She has full range of motion at the fingers and distal CMS is intact.  She was given Tylenol, ibuprofen and Zofran with significant improvement to her pain and nausea.    Differential diagnosis includes bony fracture, wrist sprain, dislocation.  X-ray of the left hand, wrist and forearm are negative for any acute fracture.  She will be placed in a thumb spica splint and recommended to follow-up with either [facility] or primary care for consideration of repeat imaging.  She is recommended to use Tylenol ibuprofen and ice for analgesia.     There is no evidence of acute or emergent process requiring intervention at this time. Pt is appropriate for outpatient management. Provisional nature of today's diagnosis was discussed and strict return precautions were given. Pt expressed understanding and She was discharged to home in good condition.      CRITICAL CARE: None    ED COURSE  5:40 PM Met and evaluated patient. Discussed ED plan.   6:55 PM discharged to home in good condition by RN.     MEDICATIONS GIVEN IN THE EMERGENCY:  Medications   acetaminophen (TYLENOL) tablet 1,000 mg (1,000 mg Oral Given 2/22/22 1819)   ibuprofen (ADVIL/MOTRIN) tablet 800 mg (800 mg Oral Given 2/22/22 1819)   ondansetron (ZOFRAN-ODT) ODT tab 4 mg (4 mg Oral Given 2/22/22 1818)       NEW PRESCRIPTIONS STARTED AT TODAY'S ER VISIT  Discharge Medication List as of 2/22/2022  6:54 PM             =================================================================    HPI    Patient information was obtained from: Patient    Use of Intrepreter: N/A       Estefany Lira HERBERT Javier is a 61 year old female who presents approximately 30 minutes after a fall where she slipped on the ice with her left hand outstretched.  She did not strike her head.  She did have an episode of vasovagal syncope which she relates to the pain is this has happened to her in the past with pain.  On arrival she feels at her baseline, there is pain with any movement of the hand or wrist.  She is not taking any medications for discomfort.  She does feel nauseous at this time.      REVIEW OF SYSTEMS   Review of Systems   Eyes: Negative for visual disturbance.   Respiratory: Negative for chest tightness.    Cardiovascular: Negative for chest pain.   Gastrointestinal: Positive for nausea. Negative for abdominal pain and vomiting.   Musculoskeletal: Positive for arthralgias (left wrist and forearm, left ulnar aspect of hand). Negative for gait problem and myalgias.   Skin: Negative for color change, pallor, rash and wound.   Neurological: Positive for light-headedness. Negative for dizziness, tremors, speech difficulty and headaches.   Hematological: Negative for adenopathy.   Psychiatric/Behavioral: Negative for agitation, behavioral problems and confusion.   All other systems reviewed and are negative.        PAST MEDICAL  HISTORY:  Past Medical History:   Diagnosis Date     Adhesive capsulitis of shoulder     Created by Conversion      Anemia, unspecified     Created by Conversion      Incontinence      Leukocytopenia, unspecified     Created by Conversion        PAST SURGICAL HISTORY:  Past Surgical History:   Procedure Laterality Date     MICRODISCECTOMY LUMBAR       ZZC REPAIR BLADDER WOUND/INJ,SIMPLE      Description: Bladder Cystorrhaphy;  Recorded: 05/06/2008;       CURRENT MEDICATIONS:    folic acid 800 MCG tablet  methotrexate 2.5 MG tablet        ALLERGIES:  Allergies   Allergen Reactions     Shellfish-Derived Products Anaphylaxis     Iodine Other (See Comments)       FAMILY HISTORY:  Family History   Problem Relation Age of Onset     Dementia Mother      Prostate Cancer Father      Heart Disease Father      Thalassemia Sister         alpha thalassemia trait     Hereditary Breast and Ovarian Cancer Syndrome No family hx of      Breast Cancer No family hx of      Cancer No family hx of      Colon Cancer No family hx of      Endometrial Cancer No family hx of      Ovarian Cancer No family hx of        SOCIAL HISTORY:   Social History     Socioeconomic History     Marital status: Single     Spouse name: Not on file     Number of children: Not on file     Years of education: Not on file     Highest education level: Not on file   Occupational History     Not on file   Tobacco Use     Smoking status: Never Smoker     Smokeless tobacco: Never Used   Substance and Sexual Activity     Alcohol use: Not on file     Drug use: Not on file     Sexual activity: Not on file   Other Topics Concern     Not on file   Social History Narrative     Not on file     Social Determinants of Health     Financial Resource Strain: Not on file   Food Insecurity: Not on file   Transportation Needs: Not on file   Physical Activity: Not on file   Stress: Not on file   Social Connections: Not on file   Intimate Partner Violence: Not on file   Housing  "Stability: Not on file         VITALS:  Patient Vitals for the past 24 hrs:   BP Temp Temp src Pulse Resp SpO2 Height Weight   02/22/22 1733 (!) 156/92 98  F (36.7  C) Temporal 77 16 100 % 1.6 m (5' 3\") 50.8 kg (112 lb)       PHYSICAL EXAM    Physical Exam  Vitals reviewed.   Constitutional:       General: She is not in acute distress.     Appearance: She is not ill-appearing, toxic-appearing or diaphoretic.   HENT:      Head: Normocephalic and atraumatic.   Eyes:      General: No scleral icterus.     Pupils: Pupils are equal, round, and reactive to light.   Neck:      Comments: No ttp midline cervical spine. No discomfort with axial loading. Able to rotate chin to bilateral shoulder, flex and extend neck without difficulty  Cardiovascular:      Rate and Rhythm: Normal rate.      Pulses: Normal pulses.   Pulmonary:      Effort: Pulmonary effort is normal. No respiratory distress.   Musculoskeletal:         General: Tenderness present. Normal range of motion.      Cervical back: Normal range of motion. No rigidity.      Comments: Tenderness to palpation along left fifth metacarpal, left scaphoid and proximal ulna.  Distal CMS intact.  Forearm is soft   Skin:     General: Skin is warm and dry.      Capillary Refill: Capillary refill takes less than 2 seconds.   Neurological:      General: No focal deficit present.      Mental Status: She is alert.      Cranial Nerves: No cranial nerve deficit.   Psychiatric:         Mood and Affect: Mood normal.         Behavior: Behavior normal.          LAB:  All pertinent labs reviewed and interpreted.    Labs Ordered and Resulted from Time of ED Arrival to Time of ED Departure - No data to display      RADIOLOGY:  Reviewed all pertinent imaging. Please see official radiology report    Radius/Ulna XR,  PA &LAT, left   Final Result   IMPRESSION:       Left forearm, wrist, and hand negative for acute fracture or dislocation.       Chronic defect in the tuft of the distal phalanx. " Mild degenerative arthritic changes at the base of the thumb and the fingers. Mild soft tissue swelling around the distal ulna, nonspecific. Benign cystic change within the ulnar styloid.      XR Hand Left G/E 3 Views   Final Result   IMPRESSION:       Left forearm, wrist, and hand negative for acute fracture or dislocation.       Chronic defect in the tuft of the distal phalanx. Mild degenerative arthritic changes at the base of the thumb and the fingers. Mild soft tissue swelling around the distal ulna, nonspecific. Benign cystic change within the ulnar styloid.      XR Wrist Left G/E 3 Views   Final Result   IMPRESSION:       Left forearm, wrist, and hand negative for acute fracture or dislocation.       Chronic defect in the tuft of the distal phalanx. Mild degenerative arthritic changes at the base of the thumb and the fingers. Mild soft tissue swelling around the distal ulna, nonspecific. Benign cystic change within the ulnar styloid.            Sally Craig PA-C  Emergency Medicine  [facility] EMERGENCY ROOM  [facility] [facility] [address] [phone]  Dept: [phone]    This note has in part been created with speech recognition technology and may create an occasional, unintended word/grammar substitution. Errors are generally corrected in real time. Please message me via [employer] if you note any errors requiring clarification.       Sally Craig PA-C  02/22/22 9862

## 2022-03-07 ENCOUNTER — NURSE TRIAGE (OUTPATIENT)
Dept: NURSING | Facility: CLINIC | Age: 62
End: 2022-03-07
Payer: COMMERCIAL

## 2022-03-07 NOTE — TELEPHONE ENCOUNTER
Estefany Lira has a trip planned to go out of the country and now just found out that she is positive for covid.  Today patient is calling with questions on quarantine.  Patient has a cough and sore throat and tested positive for covid on Sunday, March 6th.      COVID 19 Nurse Triage Plan/Patient Instructions    Please be aware that novel coronavirus (COVID-19) may be circulating in the community. If you develop symptoms such as fever, cough, or SOB or if you have concerns about the presence of another infection including coronavirus (COVID-19), please contact your health care provider or visit [email or web address].     Disposition/Instructions    Home care recommended. Follow home care protocol based instructions.    Thank you for taking steps to prevent the spread of this virus.  o Limit your contact with others.  o Wear a simple mask to cover your cough.  o Wash your hands well and often.    Resources    [facility]: About COVID-19: [email or web address]    CDC: What to Do If You're Sick: www.cdc.gov/coronavirus/2019-ncov/about/steps-when-sick.html    CDC: Ending Home Isolation: www.cdc.gov/coronavirus/2019-ncov/hcp/disposition-in-home-patients.html     CDC: Caring for Someone: www.cdc.gov/coronavirus/2019-ncov/if-you-are-sick/care-for-someone.html     [facility]: Interim Guidance for Hospital Discharge to Home: www.health.[facility].[place]/diseases/coronavirus/hcp/hospdischarge.pdf    [facility] clinical trials (COVID-19 research studies): [email or web address].[email or web address]/n-clinical-trials     Below are the COVID-19 hotlines at the [place] Department of Health ([facility]). Interpreters are available.   o For health questions: Call 249-720-5891 or 1-784.274.2293 (7 a.m. to 7 p.m.)  o For questions about schools and childcare: Call 097-367-8750 or 1-312.458.9826 (7 a.m. to 7 p.m.)                     Reason for Disposition    [1] COVID-19 diagnosed by positive lab test AND [2] mild symptoms (e.g., cough,  fever, others) AND [3] no complications or SOB    Additional Information    Negative: SEVERE difficulty breathing (e.g., struggling for each breath, speaks in single words)    Negative: Difficult to awaken or acting confused (e.g., disoriented, slurred speech)    Negative: Bluish (or gray) lips or face now    Negative: Shock suspected (e.g., cold/pale/clammy skin, too weak to stand, low BP, rapid pulse)    Negative: Sounds like a life-threatening emergency to the triager    Negative: SEVERE or constant chest pain or pressure (Exception: mild central chest pain, present only when coughing)    Negative: MODERATE difficulty breathing (e.g., speaks in phrases, SOB even at rest, pulse 100-120)    Negative: [1] Headache AND [2] stiff neck (can't touch chin to chest)    Negative: MILD difficulty breathing (e.g., minimal/no SOB at rest, SOB with walking, pulse <100)    Negative: Chest pain or pressure    Negative: Patient sounds very sick or weak to the triager    Negative: Fever > 103 F (39.4 C)    Negative: [1] Fever > 101 F (38.3 C) AND [2] age > 60 years    Negative: [1] Fever > 100.0 F (37.8 C) AND [2] bedridden (e.g., nursing home patient, CVA, chronic illness, recovering from surgery)    Negative: HIGH RISK for severe COVID complications (e.g., age > 64 years, obesity with BMI > 25, pregnant, chronic lung disease or other chronic medical condition)  (Exception: Already seen by PCP and no new or worsening symptoms.)    Negative: [1] HIGH RISK patient AND [2] influenza is widespread in the community AND [3] ONE OR MORE respiratory symptoms: cough, sore throat, runny or stuffy nose    Negative: [1] HIGH RISK patient AND [2] influenza exposure within the last 7 days AND [3] ONE OR MORE respiratory symptoms: cough, sore throat, runny or stuffy nose    Negative: [1] COVID-19 infection suspected by caller or triager AND [2] mild symptoms (cough, fever, or others) AND [3] negative COVID-19 rapid test    Negative: Fever  present > 3 days (72 hours)    Negative: [1] Fever returns after gone for over 24 hours AND [2] symptoms worse or not improved    Negative: [1] Continuous (nonstop) coughing interferes with work or school AND [2] no improvement using cough treatment per protocol    Negative: [1] COVID-19 diagnosed by positive lab test AND [2] NO symptoms (e.g., cough, fever, others)    Protocols used: CORONAVIRUS (COVID-19) DIAGNOSED OR [id number]-IQ 8.25.2021

## 2022-03-18 ENCOUNTER — TRANSFERRED RECORDS (OUTPATIENT)
Dept: HEALTH INFORMATION MANAGEMENT | Facility: CLINIC | Age: 62
End: 2022-03-18
Payer: COMMERCIAL

## 2022-05-17 ENCOUNTER — TRANSFERRED RECORDS (OUTPATIENT)
Dept: HEALTH INFORMATION MANAGEMENT | Facility: CLINIC | Age: 62
End: 2022-05-17
Payer: COMMERCIAL

## 2022-07-08 ENCOUNTER — OFFICE VISIT (OUTPATIENT)
Dept: FAMILY MEDICINE | Facility: CLINIC | Age: 62
End: 2022-07-08
Payer: COMMERCIAL

## 2022-07-08 VITALS
OXYGEN SATURATION: 97 % | HEIGHT: 62 IN | WEIGHT: 111 LBS | SYSTOLIC BLOOD PRESSURE: 124 MMHG | HEART RATE: 88 BPM | DIASTOLIC BLOOD PRESSURE: 64 MMHG | BODY MASS INDEX: 20.43 KG/M2

## 2022-07-08 DIAGNOSIS — T78.2XXD ANAPHYLAXIS, SUBSEQUENT ENCOUNTER: ICD-10-CM

## 2022-07-08 DIAGNOSIS — M72.2 PLANTAR FASCIITIS: ICD-10-CM

## 2022-07-08 DIAGNOSIS — M25.551 HIP PAIN, RIGHT: ICD-10-CM

## 2022-07-08 DIAGNOSIS — Z91.013 SEAFOOD ALLERGY: Primary | ICD-10-CM

## 2022-07-08 PROCEDURE — 99214 OFFICE O/P EST MOD 30 MIN: CPT | Performed by: FAMILY MEDICINE

## 2022-07-08 RX ORDER — EPINEPHRINE 0.3 MG/.3ML
0.3 INJECTION SUBCUTANEOUS PRN
Qty: 2 EACH | Refills: 11 | Status: SHIPPED | OUTPATIENT
Start: 2022-07-08 | End: 2023-12-01

## 2022-07-08 NOTE — PROGRESS NOTES
Assessment/Plan:    Seafood allergy  Known seafood allergy with prior anaphylactic reaction.  EpiPen was provided for refills.  Has not needed to use it since initial diagnosis around age 30.  - EPINEPHrine (ANY BX GENERIC EQUIV) 0.3 MG/0.3ML injection 2-pack  Dispense: 2 each; Refill: 11    Anaphylaxis, subsequent encounter  As above.  - EPINEPHrine (ANY BX GENERIC EQUIV) 0.3 MG/0.3ML injection 2-pack  Dispense: 2 each; Refill: 11    Hip pain, right  Chronic right hip pain with describe a labral tear.  Has been seen previously through [facility] and will schedule office visit for further evaluation and treatment options.  - Orthopedic  Referral    Plantar fasciitis  Bilateral foot pain described worse in the morning or after first getting out of a chair.  Consistent with plantar fasciitis with mild right greater than left ankle pronation associated with pes planus deformity.  Custom orthotics recommended.  - Orthotics and Prosthetics DME Orthotic; Other (comments) (bilateral foot orthotics)        Subjective:    Estefany Javier is seen today for refill request for EpiPen.  Had seafood allergy reaction when he she was in [place] around age 30.  Anaphylactic reaction with loss of consciousness and seizure.  Has not had further issues.  Had seen allergist who declined specific testing stating severity of response and recommendation for seafood/shellfish avoidance and use of EpiPen in future if necessary.  Has also had ongoing right hip pain.  Symptoms were past many years.  Had a discectomy procedure about 11 or 12 years ago.  This helped of symptoms it in her lower leg however still has chronic right hip discomfort.  Tries to avoid chronic pain medication.  Continues to golf on a consistent basis.  Tends to get worse at around the 12th hole with twisting activity.  Patient also describes bilateral foot pain.  Has been working at [employer] part-time and on her feet more.  Tries to work supportive tennis  shoes.  Foot pain worse when she first gets up as well as after sitting for a while.  Comprehensive review of systems as above otherwise all negative.    Single  S.O. [place]o  No children  Tobacco:  none  EtOH:  occ beer or wine (1 per day max)  Mom -  93  Dad -  93  Parents had been  x 72 years  6 siblings  Surgeries:  microdiscetomy ; bladder surgery due to incontinence; right knee surgery in   Hospitalizations:  none  Retired -  ([employer])  Hobbies:  golfing; walking    Past Surgical History:   Procedure Laterality Date     MICRODISCECTOMY LUMBAR       ZZC REPAIR BLADDER WOUND/INJ,SIMPLE      Description: Bladder Cystorrhaphy;  Recorded: 2008;        Family History   Problem Relation Age of Onset     Dementia Mother      Prostate Cancer Father      Heart Disease Father      Thalassemia Sister         alpha thalassemia trait     Hereditary Breast and Ovarian Cancer Syndrome No family hx of      Breast Cancer No family hx of      Cancer No family hx of      Colon Cancer No family hx of      Endometrial Cancer No family hx of      Ovarian Cancer No family hx of         Past Medical History:   Diagnosis Date     Adhesive capsulitis of shoulder     Created by Conversion      Anemia, unspecified     Created by Conversion      Incontinence      Leukocytopenia, unspecified     Created by Conversion         Social History     Tobacco Use     Smoking status: Never Smoker     Smokeless tobacco: Never Used        Current Outpatient Medications   Medication Sig Dispense Refill     EPINEPHrine (ANY BX GENERIC EQUIV) 0.3 MG/0.3ML injection 2-pack Inject 0.3 mLs (0.3 mg) into the muscle as needed for anaphylaxis May repeat one time in 5-15 minutes if response to initial dose is inadequate. 2 each 11     folic acid 800 MCG tablet Take 800 mcg by mouth daily       methotrexate 2.5 MG tablet Take by mouth every 7 days            Objective:Answers for HPI/ROS submitted by the  "patient on 7/1/2022  How many servings of fruits and vegetables do you eat daily?: 2-3  On average, how many sweetened beverages do you drink each day (Examples: soda, juice, sweet tea, etc.  Do NOT count diet or artificially sweetened beverages)?: 0  How many minutes a day do you exercise enough to make your heart beat faster?: 30 to 60  How many days a week do you exercise enough to make your heart beat faster?: 4  How many days per week do you miss taking your medication?: 0  What is the reason for your visit today?: Hip pain, burning and sore feet, need epipen  When did your symptoms begin?: More than a month  What are your symptoms?: Burning feet, cannot sit without hip pain  How would you describe these symptoms?: Severe  Are your symptoms:: Worsening  Have you had these symptoms before?: Yes  Have you tried or received treatment for these symptoms before?: Yes  Did that treatment work? : No  Is there anything that makes you feel worse?: For my hip sitting makes it worse  Is there anything that makes you feel better?: For my hip, laying down in bed        Vitals:    07/08/22 1434   BP: 124/64   Pulse: 88   SpO2: 97%   Weight: 50.3 kg (111 lb)   Height: 1.581 m (5' 2.25\")      Body mass index is 20.14 kg/m .    Alert.  No apparent distress at rest.  Mild pes planus deformity bilateral with right greater than left ankle pronation on exam.      This note has been dictated using voice recognition software and as a result may contain minor grammatical errors and unintended word substitutions.   DME (Durable Medical Equipment) Orders and Documentation  Orders Placed This Encounter   Procedures     Orthotics and Prosthetics DME Orthotic; Other (comments) (bilateral foot orthotics)      The patient was assessed and it was determined the patient is in need of the following listed DME Supplies/Equipment. Please complete supporting documentation below to demonstrate medical necessity.      DME All Other Item(s) " Documentation    List reason for need and supporting documentation for medical necessity below for each DME item.     1.  Bilateral Planter fasciitis requiring custom orthotics due to pes planus deformity and right greater than left ankle pronation findings.

## 2022-07-19 ENCOUNTER — E-VISIT (OUTPATIENT)
Dept: FAMILY MEDICINE | Facility: CLINIC | Age: 62
End: 2022-07-19
Payer: COMMERCIAL

## 2022-07-19 DIAGNOSIS — R21 RASH: Primary | ICD-10-CM

## 2022-07-19 PROCEDURE — 99207 PR NON-BILLABLE SERV PER CHARTING: CPT | Performed by: FAMILY MEDICINE

## 2022-07-20 ENCOUNTER — TRANSFERRED RECORDS (OUTPATIENT)
Dept: HEALTH INFORMATION MANAGEMENT | Facility: CLINIC | Age: 62
End: 2022-07-20

## 2022-07-20 ENCOUNTER — OFFICE VISIT (OUTPATIENT)
Dept: FAMILY MEDICINE | Facility: CLINIC | Age: 62
End: 2022-07-20
Payer: COMMERCIAL

## 2022-07-20 VITALS
BODY MASS INDEX: 20.32 KG/M2 | HEART RATE: 64 BPM | DIASTOLIC BLOOD PRESSURE: 73 MMHG | RESPIRATION RATE: 16 BRPM | SYSTOLIC BLOOD PRESSURE: 109 MMHG | TEMPERATURE: 97.8 F | WEIGHT: 112 LBS
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
## 2022-07-20 DIAGNOSIS — S30.860A INSECT BITE OF LOWER BACK, INITIAL ENCOUNTER: Primary | ICD-10-CM

## 2022-07-20 PROCEDURE — 99213 OFFICE O/P EST LOW 20 MIN: CPT | Performed by: PHYSICIAN ASSISTANT

## 2022-07-20 NOTE — PROGRESS NOTES
Subjective:      Estefany Javier is a 61 year old female with chief complaint of possible tick bite.  She noticed a spot on her back that looked like an insect bite.  She noticed this approximately 8 days ago.  It was quite red at the start, has now improved a lot.  It was recommended she get this checked out.  She did not identify what type of insect bit her.  She only noticed the red skin afterwards.  Otherwise feeling okay.    Patient Active Problem List   Diagnosis     Bilateral low back pain without sciatica     Hip pain, right     Lichen sclerosus     Neuropathy       Current Outpatient Medications:      EPINEPHrine (ANY BX GENERIC EQUIV) 0.3 MG/0.3ML injection 2-pack, Inject 0.3 mLs (0.3 mg) into the muscle as needed for anaphylaxis May repeat one time in 5-15 minutes if response to initial dose is inadequate., Disp: 2 each, Rfl: 11     folic acid 800 MCG tablet, Take 800 mcg by mouth daily, Disp: , Rfl:      methotrexate 2.5 MG tablet, Take by mouth every 7 days, Disp: , Rfl:      Objective:     Allergies:  Shellfish-derived products and Iodine    Vitals:  /73 (BP Location: Right arm, Patient Position: Sitting, Cuff Size: Adult Regular)   Pulse 64   Temp 97.8  F (36.6  C) (Temporal)   Resp 16   Wt 50.8 kg (112 lb)   BMI 20.32 kg/m    Body mass index is 20.32 kg/m .    Vital signs reviewed.  General: Patient is alert and oriented x 3, in no apparent distress  Skin: Very small minimally erythematous well-healing lesion present at the right posterior lateral back, minimal surrounding erythema, total area affected is 3 cm, no characteristic Lyme disease skin changes      Assessment and Plan:   1. Insect bite of lower back, initial encounter  No clear identification that this was a tick bite.  Could certainly be other type of insect bite.  I reviewed with patient that if it was a tick, and a deer tick, she has exceeded the 72-hour time limit to treat prophylactically for Lyme disease.  No concerning  "signs or symptoms of Lyme disease today on exam.  I reviewed to continue to monitor symptoms.  Patient was frustrated because she felt this was a \"wasted visit.\"  She left abruptly.    This dictation uses voice recognition software, which may contain typographical errors.    "

## 2022-07-20 NOTE — PATIENT INSTRUCTIONS
Thank you for choosing us for your care. I think an in-clinic visit would be best next steps based on your symptoms. Please schedule a clinic appointment; you won t be charged for this eVisit.      You can schedule an appointment right here in [facility], or call [phone]

## 2022-08-11 ENCOUNTER — TRANSFERRED RECORDS (OUTPATIENT)
Dept: HEALTH INFORMATION MANAGEMENT | Facility: CLINIC | Age: 62
End: 2022-08-11

## 2022-10-17 ENCOUNTER — OFFICE VISIT (OUTPATIENT)
Dept: FAMILY MEDICINE | Facility: CLINIC | Age: 62
End: 2022-10-17
Payer: COMMERCIAL

## 2022-10-17 VITALS
SYSTOLIC BLOOD PRESSURE: 106 MMHG | HEIGHT: 62 IN | WEIGHT: 110.13 LBS | HEART RATE: 79 BPM | OXYGEN SATURATION: 100 % | DIASTOLIC BLOOD PRESSURE: 64 MMHG | BODY MASS INDEX: 20.26 KG/M2

## 2022-10-17 DIAGNOSIS — J01.90 ACUTE SINUSITIS WITH SYMPTOMS > 10 DAYS: Primary | ICD-10-CM

## 2022-10-17 PROCEDURE — U0003 INFECTIOUS AGENT DETECTION BY NUCLEIC ACID (DNA OR RNA); SEVERE ACUTE RESPIRATORY SYNDROME CORONAVIRUS 2 (SARS-COV-2) (CORONAVIRUS DISEASE [COVID-19]), AMPLIFIED PROBE TECHNIQUE, MAKING USE OF HIGH THROUGHPUT TECHNOLOGIES AS DESCRIBED BY CMS-2020-01-R: HCPCS | Performed by: FAMILY MEDICINE

## 2022-10-17 PROCEDURE — U0005 INFEC AGEN DETEC AMPLI PROBE: HCPCS | Performed by: FAMILY MEDICINE

## 2022-10-17 PROCEDURE — 99213 OFFICE O/P EST LOW 20 MIN: CPT | Mod: CS | Performed by: FAMILY MEDICINE

## 2022-10-17 NOTE — PATIENT INSTRUCTIONS
Sinusitis (Antibiotic Treatment)    The sinuses are air-filled spaces within the bones of the face. They connect to the inside of the nose. Sinusitis is an inflammation of the tissue that lines the sinuses. Sinusitis can occur during a cold. It can also happen due to allergies to pollens and other particles in the air. Sinusitis can cause symptoms of sinus congestion and a feeling of fullness. A sinus infection causes fever, headache, and facial pain. There is often green or yellow fluid draining from the nose or into the back of the throat (post-nasal drip). You have been given antibiotics to treat this condition.   Home care  Take the full course of antibiotics as instructed. Don't stop taking them, even when you feel better.  Drink plenty of water, hot tea, and other liquids as directed by the healthcare provider. This may help thin nasal mucus. It also may help your sinuses drain fluids.  Heat may help soothe painful areas of your face. Use a towel soaked in hot water. Or,  the shower and direct the warm spray onto your face. Using a vaporizer along with a menthol rub at night may also help soothe symptoms.   An expectorant with guaifenesin may help thin nasal mucus and help your sinuses drain fluids. Talk with your provider or pharmacists before taking an over-the-counter (OTC) medicine if you have any questions about it or its side effects..  You can use an OTC decongestant, unless a similar medicine was prescribed to you. Nasal sprays work the fastest. Use one that contains phenylephrine or oxymetazoline. First blow your nose gently. Then use the spray. Don't use these medicines more often than directed on the label. If you do, your symptoms may get worse. You may also take pills that contain pseudoephedrine. Don t use products that combine multiple medicines. This is because side effects may be increased. Read labels. You can also ask the pharmacist for help. (People with high blood pressure  should not use decongestants. They can raise blood pressure.) Talk with your provider or pharmacist if you have any questions about the medicine..  OTC antihistamines may help if allergies contributed to your sinusitis. Talk with your provider or pharmacist if you have any questions about the medicine..  Don't use nasal rinses or irrigation during an acute sinus infection, unless your healthcare provider tells you to. Rinsing may spread the infection to other areas in your sinuses.  Use acetaminophen or ibuprofen to control pain, unless another pain medicine was prescribed to you. If you have chronic liver or kidney disease or ever had a stomach ulcer, talk with your healthcare provider before using these medicines. Never give aspirin to anyone under age 18 who is ill with a fever. It may cause severe liver damage.  Don't smoke. This can make symptoms worse.    Follow-up care  Follow up with your healthcare provider, or as advised.   When to seek medical advice  Call your healthcare provider if any of these occur:   Facial pain or headache that gets worse  Stiff neck  Unusual drowsiness or confusion  Swelling of your forehead or eyelids  Symptoms don't go away in 10 days  Vision problems, such as blurred or double vision  Fever of 100.4 F (38 C) or higher, or as directed by your healthcare provider  Call 911  Call 911 if any of these occur:   Seizure  Trouble breathing  Feeling dizzy or faint  Fingernails, skin or lips look blue, purple , or gray  Prevention  Here are steps you can take to help prevent an infection:   Keep good hand washing habits.  Don t have close contact with people who have sore throats, colds, or other upper respiratory infections.  Don t smoke, and stay away from secondhand smoke.  Stay up to date with of your vaccines.  [employer] last reviewed this educational content on 12/1/2019 2000-2021 [employer]. All rights reserved. This information is not intended as a substitute for  professional medical care. Always follow your healthcare professional's instructions.        Dear Estefany Javier    After reviewing your responses, I've been able to diagnose you with?a sinus infection caused by bacteria.?     Based on your responses and diagnosis, I have prescribed Augmentin to treat your symptoms. I have sent this to your pharmacy.?     It is also important to stay well hydrated, get lots of rest and take over-the-counter decongestants,?tylenol?or ibuprofen if you?are able to?take those medications per your primary care provider to help relieve discomfort.?     It is important that you take?all of?your prescribed medication even if your symptoms are improving after a few doses.? Taking?all of?your medicine helps prevent the symptoms from returning.?     If your symptoms worsen, you develop severe headache, vomiting, high fever (>102), or are not improving in 7 days, please contact your primary care provider for an appointment or visit any of our convenient Walk-in Care or Urgent Care Centers to be seen which can be found on our website?here.?     Thanks again for choosing?us?as your health care partner,?   ?  Roselyn Saleh MD?

## 2022-10-18 LAB — SARS-COV-2 RNA RESP QL NAA+PROBE: NEGATIVE

## 2022-10-28 ENCOUNTER — TRANSFERRED RECORDS (OUTPATIENT)
Dept: HEALTH INFORMATION MANAGEMENT | Facility: CLINIC | Age: 62
End: 2022-10-28

## 2022-11-10 ENCOUNTER — OFFICE VISIT (OUTPATIENT)
Dept: PEDIATRICS | Facility: CLINIC | Age: 62
End: 2022-11-10
Payer: COMMERCIAL

## 2022-11-10 VITALS
BODY MASS INDEX: 20.61 KG/M2 | OXYGEN SATURATION: 99 % | HEIGHT: 62 IN | TEMPERATURE: 97.9 F | HEART RATE: 61 BPM | DIASTOLIC BLOOD PRESSURE: 69 MMHG | RESPIRATION RATE: 18 BRPM | SYSTOLIC BLOOD PRESSURE: 104 MMHG | WEIGHT: 112 LBS

## 2022-11-10 DIAGNOSIS — Z13.6 SCREENING FOR CARDIOVASCULAR CONDITION: ICD-10-CM

## 2022-11-10 DIAGNOSIS — Z01.419 ENCOUNTER FOR GYNECOLOGICAL EXAMINATION WITHOUT ABNORMAL FINDING: Primary | ICD-10-CM

## 2022-11-10 DIAGNOSIS — Z13.1 SCREENING FOR DIABETES MELLITUS: ICD-10-CM

## 2022-11-10 DIAGNOSIS — Z12.4 CERVICAL CANCER SCREENING: ICD-10-CM

## 2022-11-10 DIAGNOSIS — L90.0 LICHEN SCLEROSUS: ICD-10-CM

## 2022-11-10 PROBLEM — G62.9 NEUROPATHY: Status: RESOLVED | Noted: 2019-09-30 | Resolved: 2022-11-10

## 2022-11-10 LAB
CHOLEST SERPL-MCNC: 255 MG/DL
FASTING STATUS PATIENT QL REPORTED: YES
FASTING STATUS PATIENT QL REPORTED: YES
GLUCOSE BLD-MCNC: 97 MG/DL (ref 70–99)
HDLC SERPL-MCNC: 139 MG/DL
LDLC SERPL CALC-MCNC: 106 MG/DL
NONHDLC SERPL-MCNC: 116 MG/DL
TRIGL SERPL-MCNC: 51 MG/DL

## 2022-11-10 PROCEDURE — 36415 COLL VENOUS BLD VENIPUNCTURE: CPT | Performed by: PEDIATRICS

## 2022-11-10 PROCEDURE — 80061 LIPID PANEL: CPT | Performed by: PEDIATRICS

## 2022-11-10 PROCEDURE — G0123 SCREEN CERV/VAG THIN LAYER: HCPCS | Performed by: PEDIATRICS

## 2022-11-10 PROCEDURE — 90471 IMMUNIZATION ADMIN: CPT | Performed by: PEDIATRICS

## 2022-11-10 PROCEDURE — 82947 ASSAY GLUCOSE BLOOD QUANT: CPT | Performed by: PEDIATRICS

## 2022-11-10 PROCEDURE — 90682 RIV4 VACC RECOMBINANT DNA IM: CPT | Performed by: PEDIATRICS

## 2022-11-10 PROCEDURE — 87624 HPV HI-RISK TYP POOLED RSLT: CPT | Performed by: PEDIATRICS

## 2022-11-10 PROCEDURE — 99386 PREV VISIT NEW AGE 40-64: CPT | Mod: 25 | Performed by: PEDIATRICS

## 2022-11-10 SDOH — ECONOMIC STABILITY: INCOME INSECURITY: HOW HARD IS IT FOR YOU TO PAY FOR THE VERY BASICS LIKE FOOD, HOUSING, MEDICAL CARE, AND HEATING?: NOT HARD AT ALL

## 2022-11-10 SDOH — HEALTH STABILITY: PHYSICAL HEALTH: ON AVERAGE, HOW MANY MINUTES DO YOU ENGAGE IN EXERCISE AT THIS LEVEL?: 50 MIN

## 2022-11-10 SDOH — ECONOMIC STABILITY: TRANSPORTATION INSECURITY
IN THE PAST 12 MONTHS, HAS LACK OF TRANSPORTATION KEPT YOU FROM MEETINGS, WORK, OR FROM GETTING THINGS NEEDED FOR DAILY LIVING?: NO

## 2022-11-10 SDOH — ECONOMIC STABILITY: TRANSPORTATION INSECURITY
IN THE PAST 12 MONTHS, HAS THE LACK OF TRANSPORTATION KEPT YOU FROM MEDICAL APPOINTMENTS OR FROM GETTING MEDICATIONS?: NO

## 2022-11-10 SDOH — ECONOMIC STABILITY: FOOD INSECURITY: WITHIN THE PAST 12 MONTHS, YOU WORRIED THAT YOUR FOOD WOULD RUN OUT BEFORE YOU GOT MONEY TO BUY MORE.: NEVER TRUE

## 2022-11-10 SDOH — ECONOMIC STABILITY: FOOD INSECURITY: WITHIN THE PAST 12 MONTHS, THE FOOD YOU BOUGHT JUST DIDN'T LAST AND YOU DIDN'T HAVE MONEY TO GET MORE.: NEVER TRUE

## 2022-11-10 SDOH — ECONOMIC STABILITY: INCOME INSECURITY: IN THE LAST 12 MONTHS, WAS THERE A TIME WHEN YOU WERE NOT ABLE TO PAY THE MORTGAGE OR RENT ON TIME?: NO

## 2022-11-10 SDOH — HEALTH STABILITY: PHYSICAL HEALTH: ON AVERAGE, HOW MANY DAYS PER WEEK DO YOU ENGAGE IN MODERATE TO STRENUOUS EXERCISE (LIKE A BRISK WALK)?: 3 DAYS

## 2022-11-10 ASSESSMENT — ENCOUNTER SYMPTOMS
CONSTIPATION: 0
FREQUENCY: 1
JOINT SWELLING: 0
SORE THROAT: 0
BREAST MASS: 0
HEMATURIA: 0
DIZZINESS: 0
HEADACHES: 0
FEVER: 0
PARESTHESIAS: 0
NAUSEA: 0
WEAKNESS: 0
NERVOUS/ANXIOUS: 0
COUGH: 0
ARTHRALGIAS: 0
CHILLS: 0
SHORTNESS OF BREATH: 0
PALPITATIONS: 0
EYE PAIN: 0
DYSURIA: 0
DIARRHEA: 0
HEMATOCHEZIA: 0
ABDOMINAL PAIN: 0
HEARTBURN: 0
MYALGIAS: 1

## 2022-11-10 ASSESSMENT — SOCIAL DETERMINANTS OF HEALTH (SDOH)
HOW OFTEN DO YOU ATTEND CHURCH OR RELIGIOUS SERVICES?: 1 TO 4 TIMES PER YEAR
DO YOU BELONG TO ANY CLUBS OR ORGANIZATIONS SUCH AS CHURCH GROUPS UNIONS, FRATERNAL OR ATHLETIC GROUPS, OR SCHOOL GROUPS?: YES
HOW OFTEN DO YOU GET TOGETHER WITH FRIENDS OR RELATIVES?: THREE TIMES A WEEK
ARE YOU MARRIED, WIDOWED, DIVORCED, SEPARATED, NEVER MARRIED, OR LIVING WITH A PARTNER?: LIVING WITH PARTNER
IN A TYPICAL WEEK, HOW MANY TIMES DO YOU TALK ON THE PHONE WITH FAMILY, FRIENDS, OR NEIGHBORS?: ONCE A WEEK

## 2022-11-10 ASSESSMENT — LIFESTYLE VARIABLES
HOW OFTEN DO YOU HAVE A DRINK CONTAINING ALCOHOL: 4 OR MORE TIMES A WEEK
HOW MANY STANDARD DRINKS CONTAINING ALCOHOL DO YOU HAVE ON A TYPICAL DAY: 1 OR 2
HOW OFTEN DO YOU HAVE SIX OR MORE DRINKS ON ONE OCCASION: NEVER
AUDIT-C TOTAL SCORE: 4
SKIP TO QUESTIONS 9-10: 1

## 2022-11-10 ASSESSMENT — PAIN SCALES - GENERAL: PAINLEVEL: NO PAIN (0)

## 2022-11-10 NOTE — PROGRESS NOTES
SUBJECTIVE:   CC: Estefany Lira is an 62 year old who presents for preventive health visit.       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 Lives at home with , no kids. One dog. Golf, walk, weights.     Other providers:    [facility] - chronic hip issues, herniated disk in 2010.  Got injection this past summer. Trigger, sitting. Evaluated at [place] too.     Dermatology - lichen sclerosis in groin and back. Failed light treatment. Trying methotrexate.    Left shoulder tightness, theragun        Today's PHQ-2 Score:   PHQ-2 ( 1999 Pfizer) 11/10/2022   Q1: Little interest or pleasure in doing things 0   Q2: Feeling down, depressed or hopeless 0   PHQ-2 Score 0   PHQ-2 Total Score (12-17 Years)- Positive if 3 or more points; Administer PHQ-A if positive -   Q1: Little interest or pleasure in doing things Not at all   Q2: Feeling down, depressed or hopeless Not at all   PHQ-2 Score 0       Abuse: Current or Past (Physical, Sexual or Emotional) - No  Do you feel safe in your environment? Yes        Social History     Tobacco Use     Smoking status: Never     Smokeless tobacco: Never   Substance Use Topics     Alcohol use: Not on file     If you drink alcohol do you typically have >3 drinks per day or >7 drinks per week? No    Alcohol Use 11/10/2022   Prescreen: >3 drinks/day or >7 drinks/week? No       Reviewed orders with patient.  Reviewed health maintenance and  updated orders accordingly - Yes      Breast Cancer Screening:    FHS-7:   Breast CA Risk Assessment (FHS-7) 9/14/2021   Did any of your first-degree relatives have breast or ovarian cancer? No   Did any of your relatives have bilateral breast cancer? No   Did any man in your family have breast cancer? No   Did any woman in your family have breast and ovarian cancer? No   Did any woman in your family have breast cancer before age 50 y? No   Do you have 2 or more relatives with breast and/or ovarian cancer? No   Do you have 2 or more relatives with breast and/or bowel cancer? No       Mammogram Screening: Recommended mammography every 1-2 years with patient discussion and risk factor consideration  Pertinent mammograms are reviewed under the imaging tab.    History of abnormal Pap smear: NO - age 30-65 PAP every 5 years with negative HPV co-testing recommended  PAP / HPV 8/30/2017 7/17/2014   PAP Negative for squamous intraepithelial lesion or malignancy  Electronically signed by Arianne Echeverria CT (ASCP) on 9/11/2017 at 12:17 PM   Negative for squamous intraepithelial lesion or malignancy  Electronically signed by Aura Marquez CT (ASCP) on 7/29/2014 at  2:54 PM       Reviewed and updated as needed this visit by clinical staff   Tobacco  Allergies    Med Hx  Surg Hx  Fam Hx  Soc Hx        Reviewed and updated as needed this visit by Provider                     Review of Systems   Constitutional: Negative for chills and fever.   HENT: Negative for congestion, ear pain, hearing loss and sore throat.    Eyes: Negative for pain and visual disturbance.   Respiratory: Negative for cough and shortness of breath.    Cardiovascular: Negative for chest pain, palpitations and peripheral edema.   Gastrointestinal: Negative for abdominal pain, constipation, diarrhea, heartburn, hematochezia and nausea.   Breasts:  Negative for tenderness, breast mass and discharge.   Genitourinary: Positive for frequency and  "urgency. Negative for dysuria, genital sores, hematuria, pelvic pain, vaginal bleeding and vaginal discharge.   Musculoskeletal: Positive for myalgias. Negative for arthralgias and joint swelling.   Skin: Negative for rash.   Neurological: Negative for dizziness, weakness, headaches and paresthesias.   Psychiatric/Behavioral: Negative for mood changes. The patient is not nervous/anxious.           OBJECTIVE:   /69 (BP Location: Right arm, Patient Position: Sitting, Cuff Size: Adult Regular)   Pulse 61   Temp 97.9  F (36.6  C) (Tympanic)   Resp 18   Ht 1.585 m (5' 2.4\")   Wt 50.8 kg (112 lb)   SpO2 99%   BMI 20.22 kg/m    Physical Exam  GENERAL APPEARANCE: healthy, alert and no distress  EYES: Eyes grossly normal to inspection, PERRL and conjunctivae and sclerae normal  HENT: ear canals and TM's normal, nose and mouth without ulcers or lesions, oropharynx clear and oral mucous membranes moist  NECK: no adenopathy, no asymmetry, masses, or scars and thyroid normal to palpation  RESP: lungs clear to auscultation - no rales, rhonchi or wheezes  CV: regular rate and rhythm, normal S1 S2, no S3 or S4, no murmur, click or rub, no peripheral edema and peripheral pulses strong  ABDOMEN: soft, nontender, no hepatosplenomegaly, no masses and bowel sounds normal   (female): normal female external genitalia, normal urethral meatus, vaginal mucosal atrophy noted, normal cervix, adnexae, and uterus without masses or abnormal discharge  MS: no musculoskeletal defects are noted and gait is age appropriate without ataxia  SKIN: no suspicious lesions or rashes  NEURO: Normal strength and tone, sensory exam grossly normal, mentation intact and speech normal  PSYCH: mentation appears normal and affect normal/bright    Diagnostic Test Results:  Labs reviewed in Epic    ASSESSMENT/PLAN:   (Z01.419) Encounter for gynecological examination without abnormal finding  (primary encounter diagnosis)  Comment: Tight right upper " "trap - reviewed some stretching exercises  Plan: INFLUENZA QUAD, RECOMBINANT, P-FREE (RIV4)         (FLUBLOK)            (Z12.4) Cervical cancer screening  Comment:   Plan: Pap Screen with HPV - recommended age 30 - 65         years            (L90.0) Lichen sclerosus  Comment: on methrotrexate per Derm  Plan:     (Z13.6) Screening for cardiovascular condition  Comment:   Plan: Lipid Profile (Chol, Trig, HDL, LDL calc)            (Z13.1) Screening for diabetes mellitus  Comment:   Plan: Glucose                    COUNSELING:  Reviewed preventive health counseling, as reflected in patient instructions    Estimated body mass index is 20.22 kg/m  as calculated from the following:    Height as of this encounter: 1.585 m (5' 2.4\").    Weight as of this encounter: 50.8 kg (112 lb).        She reports that she has never smoked. She has never used smokeless tobacco.      Counseling Resources:  ATP IV Guidelines  Pooled Cohorts Equation Calculator  Breast Cancer Risk Calculator  BRCA-Related Cancer Risk Assessment: FHS-7 Tool  FRAX Risk Assessment  ICSI Preventive Guidelines  Dietary Guidelines for Americans, 2010  USDA's MyPlate  ASA Prophylaxis  Lung CA Screening    Soraya Bautista MD  [facility]  "

## 2022-11-15 LAB
BKR LAB AP GYN ADEQUACY: NORMAL
BKR LAB AP GYN INTERPRETATION: NORMAL
BKR LAB AP HPV REFLEX: NORMAL
BKR LAB AP PREVIOUS ABNORMAL: NORMAL
PATH REPORT.COMMENTS IMP SPEC: NORMAL
PATH REPORT.COMMENTS IMP SPEC: NORMAL
PATH REPORT.RELEVANT HX SPEC: NORMAL

## 2022-11-16 LAB
HUMAN PAPILLOMA VIRUS 16 DNA: NEGATIVE
HUMAN PAPILLOMA VIRUS 18 DNA: NEGATIVE
HUMAN PAPILLOMA VIRUS FINAL DIAGNOSIS: NORMAL
HUMAN PAPILLOMA VIRUS OTHER HR: NEGATIVE

## 2022-12-13 ENCOUNTER — HOSPITAL ENCOUNTER (OUTPATIENT)
Dept: MAMMOGRAPHY | Facility: CLINIC | Age: 62
Discharge: HOME | End: 2022-12-13
Attending: PEDIATRICS | Admitting: PEDIATRICS
Payer: COMMERCIAL

## 2022-12-13 DIAGNOSIS — Z12.31 VISIT FOR SCREENING MAMMOGRAM: ICD-10-CM

## 2022-12-13 PROCEDURE — 77067 SCR MAMMO BI INCL CAD: CPT

## 2023-04-28 ENCOUNTER — TRANSFERRED RECORDS (OUTPATIENT)
Dept: HEALTH INFORMATION MANAGEMENT | Facility: CLINIC | Age: 63
End: 2023-04-28
Payer: COMMERCIAL

## 2023-05-09 ENCOUNTER — TRANSFERRED RECORDS (OUTPATIENT)
Dept: HEALTH INFORMATION MANAGEMENT | Facility: CLINIC | Age: 63
End: 2023-05-09
Payer: COMMERCIAL

## 2023-08-29 ENCOUNTER — OFFICE VISIT (OUTPATIENT)
Dept: URGENT CARE | Facility: URGENT CARE | Age: 63
End: 2023-08-29
Payer: COMMERCIAL

## 2023-08-29 VITALS
DIASTOLIC BLOOD PRESSURE: 73 MMHG | WEIGHT: 112 LBS | HEART RATE: 79 BPM | TEMPERATURE: 97.6 F | BODY MASS INDEX: 20.22 KG/M2 | SYSTOLIC BLOOD PRESSURE: 111 MMHG | OXYGEN SATURATION: 100 %

## 2023-08-29 DIAGNOSIS — T78.40XA ALLERGIC REACTION, INITIAL ENCOUNTER: Primary | ICD-10-CM

## 2023-08-29 DIAGNOSIS — T63.441A ALLERGIC REACTION TO BEE STING: ICD-10-CM

## 2023-08-29 PROCEDURE — 99213 OFFICE O/P EST LOW 20 MIN: CPT | Performed by: PHYSICIAN ASSISTANT

## 2023-08-29 RX ORDER — CETIRIZINE HYDROCHLORIDE 10 MG/1
10 TABLET ORAL DAILY
Qty: 30 TABLET | Refills: 0 | Status: SHIPPED | OUTPATIENT
Start: 2023-08-29 | End: 2023-12-01

## 2023-08-29 RX ORDER — METHYLPREDNISOLONE 4 MG
TABLET, DOSE PACK ORAL
Qty: 21 TABLET | Refills: 0 | Status: SHIPPED | OUTPATIENT
Start: 2023-08-29 | End: 2023-12-01

## 2023-08-29 ASSESSMENT — PAIN SCALES - GENERAL: PAINLEVEL: MODERATE PAIN (4)

## 2023-08-29 NOTE — PROGRESS NOTES
Assessment & Plan     Allergic reaction, initial encounter    Patient having a localized allergic reaction to bee sting  Patient started on:    - methylPREDNISolone (MEDROL DOSEPAK) 4 MG tablet therapy pack; Follow package instructions  - cetirizine (ZYRTEC) 10 MG tablet; Take 1 tablet (10 mg) by mouth daily    Allergic reaction to bee sting    How can you care for yourself at home?  If you know what caused your allergic reaction, be sure to avoid it. Your allergy may become more severe each time you have a reaction.  Take an over-the-counter antihistamine, such as cetirizine (Zyrtec) or loratadine (Claritin), to treat mild symptoms. Read and follow directions on the label. Some antihistamines can make you feel sleepy. Do not give antihistamines to a child unless you have checked with your doctor first. Mild symptoms include sneezing or an itchy or runny nose; an itchy mouth; a few hives or mild itching; and mild nausea or stomach discomfort.  Do not scratch hives or a rash. Put a cold, moist towel on them or take cool baths to relieve itching. Put ice packs on hives, swelling, or insect stings for 10 to 15 minutes at a time. Put a thin cloth between the ice pack and your skin. Do not take hot baths or showers. They will make the itching worse.  Your doctor may prescribe a shot of epinephrine to carry with you in case you have a severe reaction. Learn how to give yourself the shot and keep it with you at all times. Make sure it is not .  Go to the emergency room every time you have a severe reaction, even if you have used your shot of epinephrine and are feeling better. Symptoms can come back after a shot.    - methylPREDNISolone (MEDROL DOSEPAK) 4 MG tablet therapy pack; Follow package instructions  - cetirizine (ZYRTEC) 10 MG tablet; Take 1 tablet (10 mg) by mouth daily       At today's visit with Estefany Javier , we discussed results, diagnosis, medications and formulated a plan.  We also discussed red  flags for immediate return to clinic/ER, as well as indications for follow up with PCP if not improved in 3 days. Patient understood and agreed to plan. Estefany Javier was discharged with stable vitals and has no further questions.       No follow-ups on file.    Jose Martin Salter, [facility], PA-C  [facility]    Subjective   Estefany Lira is a 62 year old, presenting for the following health issues:  Urgent Care (Pt states that she was stung by a bee yesterday on her left thigh, swelling, red and hurts to walk.)      HPI   Review of Systems   Constitutional, HEENT, cardiovascular, pulmonary, gi and gu systems are negative, except as otherwise noted.      Objective    /73 (BP Location: Right arm, Patient Position: Sitting, Cuff Size: Adult Regular)   Pulse 79   Temp 97.6  F (36.4  C) (Oral)   Wt 50.8 kg (112 lb)   SpO2 100%   BMI 20.22 kg/m    Body mass index is 20.22 kg/m .  Physical Exam   GENERAL: healthy, alert and no distress  EYES: Eyes grossly normal to inspection, PERRL and conjunctivae and sclerae normal  HENT: ear canals and TM's normal, nose and mouth without ulcers or lesions  NECK: no adenopathy, no asymmetry, masses, or scars and thyroid normal to palpation  RESP: lungs clear to auscultation - no rales, rhonchi or wheezes  CV: regular rate and rhythm, normal S1 S2, no S3 or S4, no murmur, click or rub, no peripheral edema and peripheral pulses strong  MS: Positive for reaction on back .  Positive for large allergic reaction on anterior left thigh  SKIN: Positive for localized allergic reaction left thigh and back  NEURO: Normal strength and tone, mentation intact and speech normal  PSYCH: mentation appears normal, affect normal/bright

## 2023-09-07 NOTE — PROGRESS NOTES
Assessment & Plan     Acute sinusitis with symptoms > 10 days  Symptoms now for 3 weeks, primarily in the past 2 weeks.  Meets criteria now to treat for an antibiotic for bacterial sinusitis given symptom duration.  Reviewed side effect profile of antibiotic.  At this time no indication for imaging.  Home swabs for COVID negative; PCR COVID swab will be performed today given nature of her clinical course and upcoming trip/holiday plans as she would want to know; understands she is past any quarantine period; not eligible for tx (sx started 2-3 weeks prior)  - amoxicillin-clavulanate (AUGMENTIN) 875-125 MG tablet; Take 1 tablet by mouth 2 times daily for 7 days  - Symptomatic; Unknown COVID-19 Virus (Coronavirus) by PCR; Future                 Return in about 1 year (around 10/17/2023) for Annual physical.    Roselyn Saleh MD  [facility]   Estefany Lira is a 62 year old, presenting for the following health issues:  Sinus Problem (Has been dealing with a cold going on 2 weeks now. Had sore throat so bad was coughing up blood. Mucous is now greenish/yellow. Has low grade fever for a few days. Has a few headaches. Runny nose. Multiple negative Covid tests. Has tried multiple OTC meds: Benadryl, sudafed, etc. )    Overall sx started 3 weeks ago with drippy nose; thought it was allergies and took zyrtec. That didn't really help  Sore throat started up  Increased congestion rhinorhea  Had a small amount of blood with her cough once- related to dry throat she thought    Tried flonase  Ears feel plugged    Hx of a bad sinus infection  In the past - this hasn't been as bad  Had her COVID booster recently    Had COVID illness in March right before her [place] trip which had to be canceled despite being super covid conscious      Sinus Problem     History of Present Illness       Reason for visit:  Potential sinus infection  Symptom onset:  1-2 weeks ago  Symptoms  "include:  Green mucus, nasal congestion, headache, slight temp  Symptom intensity:  Moderate  Symptom progression:  Staying the same  Had these symptoms before:  No  What makes it worse:  No  What makes it better:  No Sudafed and other drugs not helping    She eats 2-3 servings of fruits and vegetables daily.She consumes 0 sweetened beverage(s) daily.She exercises with enough effort to increase her heart rate 30 to 60 minutes per day.  She exercises with enough effort to increase her heart rate 3 or less days per week.   She is taking medications regularly.             Review of Systems   Constitutional, HEENT, cardiovascular, pulmonary, gi and gu systems are negative, except as otherwise noted.      Objective    /64 (BP Location: Left arm, Patient Position: Sitting, Cuff Size: Adult Regular)   Pulse 79   Ht 1.581 m (5' 2.25\")   Wt 50 kg (110 lb 2 oz)   SpO2 100%   BMI 19.98 kg/m    Body mass index is 19.98 kg/m .  Physical Exam   GENERAL: healthy, alert and no distress.  Intermittent cough  EYES: Eyes grossly normal to inspection, PERRL and conjunctivae and sclerae normal  HENT: ear canals and TM's normal, nose and mouth without ulcers or lesions.  Posterior oropharynx is notably erythematous, no tonsillar exudate or swelling.  + TTP over the maxillary sinuses bilaterally  NECK: no adenopathy, no asymmetry, masses, or scars and thyroid normal to palpation  RESP: lungs clear to auscultation - no rales, rhonchi or wheezes  CV: regular rate and rhythm, normal S1 S2, no S3 or S4, no murmur, click or rub, no peripheral edema and peripheral pulses strong  ABDOMEN: soft, nontender, no hepatosplenomegaly, no masses and bowel sounds normal  MS: no gross musculoskeletal defects noted, no edema                    " Anesthesia Type: 1% lidocaine with epinephrine

## 2023-10-03 ENCOUNTER — TRANSFERRED RECORDS (OUTPATIENT)
Dept: HEALTH INFORMATION MANAGEMENT | Facility: CLINIC | Age: 63
End: 2023-10-03
Payer: COMMERCIAL

## 2023-11-04 ENCOUNTER — IMMUNIZATION (OUTPATIENT)
Dept: FAMILY MEDICINE | Facility: CLINIC | Age: 63
End: 2023-11-04
Payer: COMMERCIAL

## 2023-11-04 PROCEDURE — 90471 IMMUNIZATION ADMIN: CPT

## 2023-11-04 PROCEDURE — 90682 RIV4 VACC RECOMBINANT DNA IM: CPT

## 2023-11-07 ENCOUNTER — TRANSFERRED RECORDS (OUTPATIENT)
Dept: HEALTH INFORMATION MANAGEMENT | Facility: CLINIC | Age: 63
End: 2023-11-07
Payer: COMMERCIAL

## 2023-11-17 ENCOUNTER — TRANSFERRED RECORDS (OUTPATIENT)
Dept: HEALTH INFORMATION MANAGEMENT | Facility: CLINIC | Age: 63
End: 2023-11-17
Payer: COMMERCIAL

## 2023-11-22 ENCOUNTER — TRANSFERRED RECORDS (OUTPATIENT)
Dept: HEALTH INFORMATION MANAGEMENT | Facility: CLINIC | Age: 63
End: 2023-11-22
Payer: COMMERCIAL

## 2023-11-25 ASSESSMENT — ENCOUNTER SYMPTOMS
PALPITATIONS: 0
DYSURIA: 0
ARTHRALGIAS: 1
FEVER: 0
SORE THROAT: 0
HEMATURIA: 0
CHILLS: 0
WEAKNESS: 0
JOINT SWELLING: 0
EYE PAIN: 0
DIZZINESS: 0
DIARRHEA: 0
CONSTIPATION: 0
HEADACHES: 0
HEMATOCHEZIA: 0
ABDOMINAL PAIN: 0
SHORTNESS OF BREATH: 0
BREAST MASS: 0
FREQUENCY: 0
NERVOUS/ANXIOUS: 0
MYALGIAS: 0
NAUSEA: 0
HEARTBURN: 0
COUGH: 0

## 2023-12-01 ENCOUNTER — OFFICE VISIT (OUTPATIENT)
Dept: PEDIATRICS | Facility: CLINIC | Age: 63
End: 2023-12-01
Payer: COMMERCIAL

## 2023-12-01 VITALS
HEART RATE: 74 BPM | BODY MASS INDEX: 20.61 KG/M2 | OXYGEN SATURATION: 100 % | HEIGHT: 62 IN | DIASTOLIC BLOOD PRESSURE: 60 MMHG | WEIGHT: 112 LBS | SYSTOLIC BLOOD PRESSURE: 110 MMHG

## 2023-12-01 DIAGNOSIS — Z91.013 SEAFOOD ALLERGY: ICD-10-CM

## 2023-12-01 DIAGNOSIS — N39.41 URGE INCONTINENCE OF URINE: ICD-10-CM

## 2023-12-01 DIAGNOSIS — Z00.00 ROUTINE GENERAL MEDICAL EXAMINATION AT A HEALTH CARE FACILITY: Primary | ICD-10-CM

## 2023-12-01 DIAGNOSIS — T78.2XXD ANAPHYLAXIS, SUBSEQUENT ENCOUNTER: ICD-10-CM

## 2023-12-01 LAB
ALBUMIN SERPL BCG-MCNC: 4.5 G/DL (ref 3.5–5.2)
ALP SERPL-CCNC: 94 U/L (ref 40–150)
ALT SERPL W P-5'-P-CCNC: 24 U/L (ref 0–50)
ANION GAP SERPL CALCULATED.3IONS-SCNC: 10 MMOL/L (ref 7–15)
AST SERPL W P-5'-P-CCNC: 32 U/L (ref 0–45)
BILIRUB SERPL-MCNC: 0.4 MG/DL
BUN SERPL-MCNC: 23.7 MG/DL (ref 8–23)
CALCIUM SERPL-MCNC: 9.5 MG/DL (ref 8.8–10.2)
CHLORIDE SERPL-SCNC: 106 MMOL/L (ref 98–107)
CHOLEST SERPL-MCNC: 239 MG/DL
CREAT SERPL-MCNC: 0.84 MG/DL (ref 0.51–0.95)
DEPRECATED HCO3 PLAS-SCNC: 27 MMOL/L (ref 22–29)
EGFRCR SERPLBLD CKD-EPI 2021: 78 ML/MIN/1.73M2
GLUCOSE SERPL-MCNC: 91 MG/DL (ref 70–99)
HBA1C MFR BLD: 5.6 % (ref 0–5.6)
HDLC SERPL-MCNC: 124 MG/DL
LDLC SERPL CALC-MCNC: 107 MG/DL
NONHDLC SERPL-MCNC: 115 MG/DL
POTASSIUM SERPL-SCNC: 4.6 MMOL/L (ref 3.4–5.3)
PROT SERPL-MCNC: 6.9 G/DL (ref 6.4–8.3)
SODIUM SERPL-SCNC: 143 MMOL/L (ref 135–145)
TRIGL SERPL-MCNC: 42 MG/DL
VIT D+METAB SERPL-MCNC: 45 NG/ML (ref 20–50)

## 2023-12-01 PROCEDURE — 36415 COLL VENOUS BLD VENIPUNCTURE: CPT | Performed by: STUDENT IN AN ORGANIZED HEALTH CARE EDUCATION/TRAINING PROGRAM

## 2023-12-01 PROCEDURE — 83036 HEMOGLOBIN GLYCOSYLATED A1C: CPT | Performed by: STUDENT IN AN ORGANIZED HEALTH CARE EDUCATION/TRAINING PROGRAM

## 2023-12-01 PROCEDURE — 80053 COMPREHEN METABOLIC PANEL: CPT | Performed by: STUDENT IN AN ORGANIZED HEALTH CARE EDUCATION/TRAINING PROGRAM

## 2023-12-01 PROCEDURE — 99213 OFFICE O/P EST LOW 20 MIN: CPT | Mod: 25 | Performed by: STUDENT IN AN ORGANIZED HEALTH CARE EDUCATION/TRAINING PROGRAM

## 2023-12-01 PROCEDURE — 99396 PREV VISIT EST AGE 40-64: CPT | Performed by: STUDENT IN AN ORGANIZED HEALTH CARE EDUCATION/TRAINING PROGRAM

## 2023-12-01 PROCEDURE — 80061 LIPID PANEL: CPT | Performed by: STUDENT IN AN ORGANIZED HEALTH CARE EDUCATION/TRAINING PROGRAM

## 2023-12-01 PROCEDURE — 82306 VITAMIN D 25 HYDROXY: CPT | Performed by: STUDENT IN AN ORGANIZED HEALTH CARE EDUCATION/TRAINING PROGRAM

## 2023-12-01 RX ORDER — OXYBUTYNIN CHLORIDE 5 MG/1
5 TABLET, EXTENDED RELEASE ORAL DAILY
Qty: 90 TABLET | Refills: 4 | Status: SHIPPED | OUTPATIENT
Start: 2023-12-01 | End: 2023-12-27

## 2023-12-01 RX ORDER — EPINEPHRINE 0.3 MG/.3ML
0.3 INJECTION SUBCUTANEOUS PRN
Qty: 2 EACH | Refills: 11 | Status: SHIPPED | OUTPATIENT
Start: 2023-12-01

## 2023-12-01 ASSESSMENT — ENCOUNTER SYMPTOMS
HEMATURIA: 0
FREQUENCY: 0
BREAST MASS: 0
HEADACHES: 0
CONSTIPATION: 0
SHORTNESS OF BREATH: 0
EYE PAIN: 0
NAUSEA: 0
NERVOUS/ANXIOUS: 0
DYSURIA: 0
MYALGIAS: 0
ARTHRALGIAS: 1
HEARTBURN: 0
DIARRHEA: 0
DIZZINESS: 0
WEAKNESS: 0
FEVER: 0
HEMATOCHEZIA: 0
CHILLS: 0
COUGH: 0
SORE THROAT: 0
ABDOMINAL PAIN: 0
JOINT SWELLING: 0
PALPITATIONS: 0

## 2023-12-01 NOTE — PATIENT INSTRUCTIONS
If oxybutynin not helping we should have you see a urogynecologist      Preventive Health Recommendations  Female Ages 50 - 64    Yearly exam: See your health care provider every year in order to  Review health changes.   Discuss preventive care.    Review your medicines if your doctor has prescribed any.    Get a Pap test every three years (unless you have an abnormal result and your provider advises testing more often).  If you get Pap tests with HPV test, you only need to test every 5 years, unless you have an abnormal result.   You do not need a Pap test if your uterus was removed (hysterectomy) and you have not had cancer.  You should be tested each year for STDs (sexually transmitted diseases) if you're at risk.   Have a mammogram every 1 to 2 years.  Have a colonoscopy at age 45, or have a yearly FIT test (stool test). These exams screen for colon cancer.    Have a cholesterol test every 5 years, or more often if advised.  Have a diabetes test (fasting glucose) every three years. If you are at risk for diabetes, you should have this test more often.   If you are at risk for osteoporosis (brittle bone disease), think about having a bone density scan (DEXA).    Shots: Get a flu shot each year. Get a tetanus shot every 10 years.    Nutrition:   Eat at least 5 servings of fruits and vegetables each day.  Eat whole-grain bread, whole-wheat pasta and brown rice instead of white grains and rice.  Get adequate Calcium and Vitamin D.     Lifestyle  Exercise at least 150 minutes a week (30 minutes a day, 5 days a week). This will help you control your weight and prevent disease.  Limit alcohol to one drink per day.  No smoking.   Wear sunscreen to prevent skin cancer.   See your dentist every six months for an exam and cleaning.  See your eye doctor every 1 to 2 years.

## 2023-12-01 NOTE — PROGRESS NOTES
SUBJECTIVE:   Estefany Lira is a 63 year old, presenting for the following:  Physical    Healthy Habits:     Getting at least 3 servings of Calcium per day:  Yes    Bi-annual eye exam:  Yes    Dental care twice a year:  Yes    Sleep apnea or symptoms of sleep apnea:  None    Diet:  Regular (no restrictions)    Frequency of exercise:  4-5 days/week    Duration of exercise:  45-60 minutes    Additional concerns today:  Yes      Today's PHQ-2 Score:       11/30/2023    10:02 AM   PHQ-2 ( 1999 [employer])   Q1: Little interest or pleasure in doing things 0   Q2: Feeling down, depressed or hopeless 0   PHQ-2 Score 0   Q1: Little interest or pleasure in doing things Not at all   Q2: Feeling down, depressed or hopeless Not at all   PHQ-2 Score 0       Incontinence - urgency  -drinks small cup of coffee have urgency to go to bathroom  -needs to wear a thin pad every day because sneezing causes urine   -had surgery when younger  -has done pelvic floor physical therapy    Golfs, walks dogs  Weights 2-3 x per week    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 No     Reviewed orders with patient.  Reviewed health maintenance and updated orders accordingly - Yes      Breast Cancer Screening:    FHS-7:       9/14/2021     8:34 AM 12/13/2022    12:51 PM   Breast CA Risk Assessment (FHS-7)   Did any of your first-degree relatives have breast or ovarian cancer? No No   Did any of your relatives have bilateral breast cancer? No No   Did any man in your family have breast cancer? No No   Did any woman in your family have breast and ovarian cancer? No No   Did any woman in your family have breast cancer before age 50 y? No No   Do you have 2 or more relatives with breast  and/or ovarian cancer? No No   Do you have 2 or more relatives with breast and/or bowel cancer? No No         Pertinent mammograms are reviewed under the imaging tab.    History of abnormal Pap smear: NO - age 30-65 PAP every 5 years with negative HPV co-testing recommended      Latest Ref Rng & Units 11/10/2022     8:52 AM 8/30/2017     9:03 AM 7/17/2014     4:34 PM   PAP / HPV   PAP  Negative for Intraepithelial Lesion or Malignancy (NILM)  Negative for squamous intraepithelial lesion or malignancy  Electronically signed by Arianne Echeverria CT (ASCP) on 9/11/2017 at 12:17 PM    Negative for squamous intraepithelial lesion or malignancy  Electronically signed by Aura Marquez CT (ASCP) on 7/29/2014 at  2:54 PM      HPV 16 DNA Negative Negative      HPV 18 DNA Negative Negative      Other HR HPV Negative Negative        Reviewed and updated as needed this visit by clinical staff   Tobacco  Allergies  Meds              Reviewed and updated as needed this visit by Provider     Meds                 Review of Systems   Constitutional:  Negative for chills and fever.   HENT:  Negative for congestion, ear pain, hearing loss and sore throat.    Eyes:  Negative for pain and visual disturbance.   Respiratory:  Negative for cough and shortness of breath.    Cardiovascular:  Negative for chest pain, palpitations and peripheral edema.   Gastrointestinal:  Negative for abdominal pain, constipation, diarrhea, heartburn, hematochezia and nausea.   Breasts:  Negative for tenderness, breast mass and discharge.   Genitourinary:  Positive for urgency. Negative for dysuria, frequency, genital sores, hematuria, pelvic pain, vaginal bleeding and vaginal discharge.   Musculoskeletal:  Positive for arthralgias. Negative for joint swelling and myalgias.   Skin:  Negative for rash.   Neurological:  Negative for dizziness, weakness and headaches.   Psychiatric/Behavioral:  Negative for mood changes. The patient is not  "nervous/anxious.         OBJECTIVE:   /60 (BP Location: Right arm, Patient Position: Sitting, Cuff Size: Adult Regular)   Pulse 74   Ht 1.575 m (5' 2\")   Wt 50.8 kg (112 lb)   SpO2 100%   BMI 20.49 kg/m    Physical Exam  GENERAL: healthy, alert and no distress  EYES: Eyes grossly normal to inspection, and conjunctivae and sclerae normal  HENT: ear canals and TM's normal, nose and mouth without ulcers or lesions  NECK: no adenopathy, no asymmetry, masses, or scars and thyroid normal to palpation  RESP: lungs clear to auscultation - no rales, rhonchi or wheezes  CV: regular rate and rhythm, normal S1 S2, no S3 or S4, no murmur, click or rub, no peripheral edema  ABDOMEN: soft, nontender, no hepatosplenomegaly, no masses  MS: no gross musculoskeletal defects noted, no edema  SKIN: no suspicious lesions or rashes  NEURO: Normal strength and tone, mentation intact and speech normal  PSYCH: mentation appears normal, affect normal/bright    ASSESSMENT/PLAN:       ICD-10-CM    1. Routine general medical examination at a health care facility  Z00.00 Lipid panel reflex to direct LDL Fasting     Comprehensive metabolic panel (BMP + Alb, Alk Phos, ALT, AST, Total. Bili, TP)     Hemoglobin A1c     Vitamin D Deficiency     Lipid panel reflex to direct LDL Fasting     Comprehensive metabolic panel (BMP + Alb, Alk Phos, ALT, AST, Total. Bili, TP)     Hemoglobin A1c     Vitamin D Deficiency      2. Urge incontinence of urine  N39.41 oxyBUTYnin ER (DITROPAN XL) 5 MG 24 hr tablet      3. Seafood allergy  Z91.013 EPINEPHrine (ANY BX GENERIC EQUIV) 0.3 MG/0.3ML injection 2-pack      4. Anaphylaxis, subsequent encounter  T78.2XXD EPINEPHrine (ANY BX GENERIC EQUIV) 0.3 MG/0.3ML injection 2-pack        2. Likely mixed picture with stress and urge incontinence features. Trial oxybutynin for the urge aspect. If not improving recommend urogyn evaluation as she has previously had procedure and done pelvic floor physical " therapy.      COUNSELING:  Reviewed preventive health counseling, as reflected in patient instructions      She reports that she has never smoked. She has never used smokeless tobacco.          Vandana Mg MD  [facility]

## 2023-12-19 ENCOUNTER — HOSPITAL ENCOUNTER (OUTPATIENT)
Dept: MAMMOGRAPHY | Facility: CLINIC | Age: 63
Discharge: HOME | End: 2023-12-19
Attending: PEDIATRICS | Admitting: PEDIATRICS
Payer: COMMERCIAL

## 2023-12-19 DIAGNOSIS — Z12.31 VISIT FOR SCREENING MAMMOGRAM: ICD-10-CM

## 2023-12-19 PROCEDURE — 77067 SCR MAMMO BI INCL CAD: CPT

## 2023-12-26 ENCOUNTER — MYC REFILL (OUTPATIENT)
Dept: PEDIATRICS | Facility: CLINIC | Age: 63
End: 2023-12-26
Payer: COMMERCIAL

## 2023-12-26 DIAGNOSIS — N39.41 URGE INCONTINENCE OF URINE: ICD-10-CM

## 2023-12-27 RX ORDER — OXYBUTYNIN CHLORIDE 5 MG/1
5 TABLET, EXTENDED RELEASE ORAL DAILY
Qty: 90 TABLET | Refills: 4 | OUTPATIENT
Start: 2023-12-27

## 2024-02-15 ENCOUNTER — TRANSFERRED RECORDS (OUTPATIENT)
Dept: HEALTH INFORMATION MANAGEMENT | Facility: CLINIC | Age: 64
End: 2024-02-15
Payer: COMMERCIAL

## 2024-03-01 ENCOUNTER — TRANSFERRED RECORDS (OUTPATIENT)
Dept: HEALTH INFORMATION MANAGEMENT | Facility: CLINIC | Age: 64
End: 2024-03-01
Payer: COMMERCIAL

## 2024-03-14 ENCOUNTER — TRANSFERRED RECORDS (OUTPATIENT)
Dept: HEALTH INFORMATION MANAGEMENT | Facility: CLINIC | Age: 64
End: 2024-03-14
Payer: COMMERCIAL

## 2024-04-22 ENCOUNTER — TRANSFERRED RECORDS (OUTPATIENT)
Dept: HEALTH INFORMATION MANAGEMENT | Facility: CLINIC | Age: 64
End: 2024-04-22
Payer: COMMERCIAL

## 2024-04-29 ENCOUNTER — TRANSFERRED RECORDS (OUTPATIENT)
Dept: HEALTH INFORMATION MANAGEMENT | Facility: CLINIC | Age: 64
End: 2024-04-29
Payer: COMMERCIAL

## 2024-05-14 ENCOUNTER — TRANSFERRED RECORDS (OUTPATIENT)
Dept: HEALTH INFORMATION MANAGEMENT | Facility: CLINIC | Age: 64
End: 2024-05-14
Payer: COMMERCIAL

## 2024-06-17 ENCOUNTER — TRANSFERRED RECORDS (OUTPATIENT)
Dept: HEALTH INFORMATION MANAGEMENT | Facility: CLINIC | Age: 64
End: 2024-06-17
Payer: COMMERCIAL

## 2024-07-02 ENCOUNTER — TRANSFERRED RECORDS (OUTPATIENT)
Dept: HEALTH INFORMATION MANAGEMENT | Facility: CLINIC | Age: 64
End: 2024-07-02
Payer: COMMERCIAL

## 2024-11-01 ENCOUNTER — IMMUNIZATION (OUTPATIENT)
Dept: PEDIATRICS | Facility: CLINIC | Age: 64
End: 2024-11-01
Payer: COMMERCIAL

## 2024-11-01 DIAGNOSIS — Z23 ENCOUNTER FOR IMMUNIZATION: Primary | ICD-10-CM

## 2024-11-01 PROCEDURE — 90471 IMMUNIZATION ADMIN: CPT

## 2024-11-01 PROCEDURE — 90673 RIV3 VACCINE NO PRESERV IM: CPT

## 2024-11-01 PROCEDURE — 99207 PR NO CHARGE NURSE ONLY: CPT

## 2024-11-01 NOTE — PROGRESS NOTES
Prior to immunization administration, verified patients identity using patient s name and date of birth. Please see Immunization Activity for additional information.     Is the patient's temperature normal (100.5 or less)? Yes     Patient MEETS CRITERIA. PROCEED with vaccine administration.      Patient instructed to remain in clinic for 15 minutes afterwards, and to report any adverse reactions.      Link to Ancillary Visit Immunization Standing Orders SmartSet     Screening performed by Romina Solitario CMA on 11/1/2024 at 9:39 AM.

## 2024-12-02 SDOH — HEALTH STABILITY: PHYSICAL HEALTH: ON AVERAGE, HOW MANY MINUTES DO YOU ENGAGE IN EXERCISE AT THIS LEVEL?: 50 MIN

## 2024-12-02 SDOH — HEALTH STABILITY: PHYSICAL HEALTH: ON AVERAGE, HOW MANY DAYS PER WEEK DO YOU ENGAGE IN MODERATE TO STRENUOUS EXERCISE (LIKE A BRISK WALK)?: 4 DAYS

## 2024-12-02 ASSESSMENT — SOCIAL DETERMINANTS OF HEALTH (SDOH): HOW OFTEN DO YOU GET TOGETHER WITH FRIENDS OR RELATIVES?: TWICE A WEEK

## 2024-12-05 ENCOUNTER — OFFICE VISIT (OUTPATIENT)
Dept: PEDIATRICS | Facility: CLINIC | Age: 64
End: 2024-12-05
Attending: STUDENT IN AN ORGANIZED HEALTH CARE EDUCATION/TRAINING PROGRAM
Payer: COMMERCIAL

## 2024-12-05 VITALS
OXYGEN SATURATION: 99 % | RESPIRATION RATE: 14 BRPM | SYSTOLIC BLOOD PRESSURE: 130 MMHG | HEART RATE: 55 BPM | BODY MASS INDEX: 20.33 KG/M2 | WEIGHT: 110.5 LBS | DIASTOLIC BLOOD PRESSURE: 72 MMHG | HEIGHT: 62 IN | TEMPERATURE: 97.8 F

## 2024-12-05 DIAGNOSIS — N39.41 URGE INCONTINENCE OF URINE: ICD-10-CM

## 2024-12-05 DIAGNOSIS — T78.2XXD ANAPHYLAXIS, SUBSEQUENT ENCOUNTER: ICD-10-CM

## 2024-12-05 DIAGNOSIS — Z13.6 SCREENING FOR CARDIOVASCULAR CONDITION: ICD-10-CM

## 2024-12-05 DIAGNOSIS — Z91.013 SEAFOOD ALLERGY: ICD-10-CM

## 2024-12-05 DIAGNOSIS — Z13.1 SCREENING FOR DIABETES MELLITUS: ICD-10-CM

## 2024-12-05 DIAGNOSIS — Z00.00 ROUTINE GENERAL MEDICAL EXAMINATION AT A HEALTH CARE FACILITY: Primary | ICD-10-CM

## 2024-12-05 DIAGNOSIS — Z12.31 VISIT FOR SCREENING MAMMOGRAM: ICD-10-CM

## 2024-12-05 LAB
EST. AVERAGE GLUCOSE BLD GHB EST-MCNC: 120 MG/DL
HBA1C MFR BLD: 5.8 % (ref 0–5.6)

## 2024-12-05 RX ORDER — OXYBUTYNIN CHLORIDE 10 MG/1
10 TABLET, EXTENDED RELEASE ORAL DAILY
Qty: 90 TABLET | Refills: 3 | Status: SHIPPED | OUTPATIENT
Start: 2024-12-05

## 2024-12-05 RX ORDER — EPINEPHRINE 0.3 MG/.3ML
0.3 INJECTION SUBCUTANEOUS PRN
Qty: 2 EACH | Refills: 11 | Status: SHIPPED | OUTPATIENT
Start: 2024-12-05

## 2024-12-05 NOTE — PATIENT INSTRUCTIONS
Patient Education   Preventive Care Advice   This is general advice given by our system to help you stay healthy. However, your care team may have specific advice just for you. Please talk to your care team about your preventive care needs.  Nutrition  Eat 5 or more servings of fruits and vegetables each day.  Try wheat bread, brown rice and whole grain pasta (instead of white bread, rice, and pasta).  Get enough calcium and vitamin D. Check the label on foods and aim for 100% of the RDA (recommended daily allowance).  Lifestyle  Exercise at least 150 minutes each week  (30 minutes a day, 5 days a week).  Do muscle strengthening activities 2 days a week. These help control your weight and prevent disease.  No smoking.  Wear sunscreen to prevent skin cancer.  Have a dental exam and cleaning every 6 months.  Yearly exams  See your health care team every year to talk about:  Any changes in your health.  Any medicines your care team has prescribed.  Preventive care, family planning, and ways to prevent chronic diseases.  Shots (vaccines)   HPV shots (up to age 26), if you've never had them before.  Hepatitis B shots (up to age 59), if you've never had them before.  COVID-19 shot: Get this shot when it's due.  Flu shot: Get a flu shot every year.  Tetanus shot: Get a tetanus shot every 10 years.  Pneumococcal, hepatitis A, and RSV shots: Ask your care team if you need these based on your risk.  Shingles shot (for age 50 and up)  General health tests  Diabetes screening:  Starting at age 35, Get screened for diabetes at least every 3 years.  If you are younger than age 35, ask your care team if you should be screened for diabetes.  Cholesterol test: At age 39, start having a cholesterol test every 5 years, or more often if advised.  Bone density scan (DEXA): At age 50, ask your care team if you should have this scan for osteoporosis (brittle bones).  Hepatitis C: Get tested at least once in your life.  STIs (sexually  transmitted infections)  Before age 24: Ask your care team if you should be screened for STIs.  After age 24: Get screened for STIs if you're at risk. You are at risk for STIs (including HIV) if:  You are sexually active with more than one person.  You don't use condoms every time.  You or a partner was diagnosed with a sexually transmitted infection.  If you are at risk for HIV, ask about PrEP medicine to prevent HIV.  Get tested for HIV at least once in your life, whether you are at risk for HIV or not.  Cancer screening tests  Cervical cancer screening: If you have a cervix, begin getting regular cervical cancer screening tests starting at age 21.  Breast cancer scan (mammogram): If you've ever had breasts, begin having regular mammograms starting at age 40. This is a scan to check for breast cancer.  Colon cancer screening: It is important to start screening for colon cancer at age 45.  Have a colonoscopy test every 10 years (or more often if you're at risk) Or, ask your provider about stool tests like a FIT test every year or Cologuard test every 3 years.  To learn more about your testing options, visit:   .  For help making a decision, visit:   https://bit.ly/eu14402.  Prostate cancer screening test: If you have a prostate, ask your care team if a prostate cancer screening test (PSA) at age 55 is right for you.  Lung cancer screening: If you are a current or former smoker ages 50 to 80, ask your care team if ongoing lung cancer screenings are right for you.  For informational purposes only. Not to replace the advice of your health care provider. Copyright   2023 [employer]. All rights reserved. Clinically reviewed by the [facility]itions [facility]. Anpro21 188746 - REV 01/24.

## 2024-12-05 NOTE — PROGRESS NOTES
Preventive Care Visit  [facility], Internal Medicine - Pediatrics  Dec 5, 2024      Assessment & Plan     (Z00.00) Routine general medical examination at a health care facility  (primary encounter diagnosis)  Comment:   Plan:       (Z12.31) Visit for screening mammogram  Comment:   Plan: MA Screen Bilateral w/Vega            (N39.41) Urge incontinence of urine  Comment: patient getting minimal relief with 5mg (dose range is up to 30mg. Increase to 10mg.   Plan: oxyBUTYnin ER (DITROPAN XL) 10 MG 24 hr tablet            (Z91.013) Seafood allergy  Comment:   Plan: EPINEPHrine (ANY BX GENERIC EQUIV) 0.3 MG/0.3ML        injection 2-pack        refill    (T78.2XXD) Anaphylaxis, subsequent encounter  Comment:  Plan: EPINEPHrine (ANY BX GENERIC EQUIV) 0.3 MG/0.3ML        injection 2-pack        refill    (Z13.1) Screening for diabetes mellitus  Comment:   Plan: Hemoglobin A1c            (Z13.6) Screening for cardiovascular condition  Comment:   Plan: Lipid Profile (Chol, Trig, HDL, LDL calc)            Patient has been advised of split billing requirements and indicates understanding: Yes        Counseling  Appropriate preventive services were addressed with this patient via screening, questionnaire, or discussion as appropriate for fall prevention, nutrition, physical activity, Tobacco-use cessation, social engagement, weight loss and cognition.  Checklist reviewing preventive services available has been given to the patient.  Reviewed patient's diet, addressing concerns and/or questions.       See Patient Instructions    Subjective   Estefany Lira is a 64 year old, presenting for the following:  Physical        12/5/2024     1:53 PM   Additional Questions   Roomed by Lulu Juarez CMA          HPI    Incontinence - has been on ditropan ER 5mg - unsure if working. Usually earlier in the day - drinks 1 cup of coffee. Urgency incontinence.       Health Care Directive  Patient does not have  a Health Care Directive: Discussed advance care planning with patient; information given to patient to review.      12/2/2024   General Health   How would you rate your overall physical health? Good   Feel stress (tense, anxious, or unable to sleep) Not at all            12/2/2024   Nutrition   Three or more servings of calcium each day? (!) I DON'T KNOW   Diet: Regular (no restrictions)   How many servings of fruit and vegetables per day? (!) 2-3   How many sweetened beverages each day? 0-1            12/2/2024   Exercise   Days per week of moderate/strenous exercise 4 days   Average minutes spent exercising at this level 50 min      [employer]        12/2/2024   Social Factors   Frequency of gathering with friends or relatives Twice a week   Worry food won't last until get money to buy more No   Food not last or not have enough money for food? No   Do you have housing? (Housing is defined as stable permanent housing and does not include staying ouside in a car, in a tent, in an abandoned building, in an overnight shelter, or couch-surfing.) Yes   Are you worried about losing your housing? No   Lack of transportation? No   Unable to get utilities (heat,electricity)? No            12/2/2024   Fall Risk   Fallen 2 or more times in the past year? No    Trouble with walking or balance? No        Patient-reported          12/2/2024   Dental   Dentist two times every year? Yes            12/2/2024   TB Screening   Were you born outside of the [place]? No            Today's PHQ-2 Score:       12/5/2024     1:49 PM   PHQ-2 ( 1999 Pfizer)   Q1: Little interest or pleasure in doing things 0    Q2: Feeling down, depressed or hopeless 0    PHQ-2 Score 0    Q1: Little interest or pleasure in doing things Not at all   Q2: Feeling down, depressed or hopeless Not at all   PHQ-2 Score 0       Patient-reported           12/2/2024   Substance Use   Alcohol more than 3/day or more than 7/wk No   Do you use any other substances  "recreationally? No        Social History     Tobacco Use    Smoking status: Never    Smokeless tobacco: Never   Vaping Use    Vaping status: Never Used   Substance Use Topics    Alcohol use: Yes    Drug use: Never           12/19/2023   LAST FHS-7 RESULTS   1st degree relative breast or ovarian cancer No   Any relative bilateral breast cancer No   Any male have breast cancer No   Any ONE woman have BOTH breast AND ovarian cancer No   Any woman with breast cancer before 50yrs No   2 or more relatives with breast AND/OR ovarian cancer No   2 or more relatives with breast AND/OR bowel cancer No           Mammogram Screening - Mammogram every 1-2 years updated in Health Maintenance based on mutual decision making        12/2/2024   STI Screening   New sexual partner(s) since last STI/HIV test? No        History of abnormal Pap smear: No - age 30-64 HPV with reflex Pap every 5 years recommended        Latest Ref Rng & Units 11/10/2022     8:52 AM 8/30/2017     9:03 AM 7/17/2014     4:34 PM   PAP / HPV   PAP  Negative for Intraepithelial Lesion or Malignancy (NILM)  Negative for squamous intraepithelial lesion or malignancy  Electronically signed by Arianne Echeverria CT (ASCP) on 9/11/2017 at 12:17 PM    Negative for squamous intraepithelial lesion or malignancy  Electronically signed by Aura Marquez CT (ASCP) on 7/29/2014 at  2:54 PM      HPV 16 DNA Negative Negative      HPV 18 DNA Negative Negative      Other HR HPV Negative Negative        ASCVD Risk   The ASCVD Risk score (Laurel LEE, et al., 2019) failed to calculate for the following reasons:    The valid HDL cholesterol range is 20 to 100 mg/dL           Reviewed and updated as needed this visit by Provider                             Objective    Exam  /72 (BP Location: Right arm, Patient Position: Sitting, Cuff Size: Adult Regular)   Pulse 55   Temp 97.8  F (36.6  C) (Temporal)   Resp 14   Ht 1.575 m (5' 2\")   Wt 50.1 kg (110 lb 8 " "oz)   SpO2 99%   BMI 20.21 kg/m     Estimated body mass index is 20.21 kg/m  as calculated from the following:    Height as of this encounter: 1.575 m (5' 2\").    Weight as of this encounter: 50.1 kg (110 lb 8 oz).    Physical Exam  GENERAL: alert and no distress  EYES: Eyes grossly normal to inspection, PERRL and conjunctivae and sclerae normal  HENT: ear canals and TM's normal, nose and mouth without ulcers or lesions  NECK: no adenopathy, no asymmetry, masses, or scars  RESP: lungs clear to auscultation - no rales, rhonchi or wheezes  CV: regular rate and rhythm, normal S1 S2, no S3 or S4, no murmur, click or rub, no peripheral edema  ABDOMEN: soft, nontender, no hepatosplenomegaly, no masses and bowel sounds normal  MS: no gross musculoskeletal defects noted, no edema  SKIN: no suspicious lesions or rashes  NEURO: Normal strength and tone, mentation intact and speech normal  PSYCH: mentation appears normal, affect normal/bright        Signed Electronically by: Soraya Bautista MD    "

## 2025-01-07 ENCOUNTER — ANCILLARY PROCEDURE (OUTPATIENT)
Dept: MAMMOGRAPHY | Facility: CLINIC | Age: 65
End: 2025-01-07
Attending: PEDIATRICS
Payer: COMMERCIAL

## 2025-01-07 DIAGNOSIS — Z12.31 VISIT FOR SCREENING MAMMOGRAM: ICD-10-CM

## 2025-01-07 PROCEDURE — 77067 SCR MAMMO BI INCL CAD: CPT | Mod: TC | Performed by: RADIOLOGY

## 2025-01-07 PROCEDURE — 77063 BREAST TOMOSYNTHESIS BI: CPT | Mod: TC | Performed by: RADIOLOGY

## 2025-02-26 ENCOUNTER — OFFICE VISIT (OUTPATIENT)
Dept: PEDIATRICS | Facility: CLINIC | Age: 65
End: 2025-02-26
Payer: COMMERCIAL

## 2025-02-26 VITALS
DIASTOLIC BLOOD PRESSURE: 67 MMHG | SYSTOLIC BLOOD PRESSURE: 101 MMHG | HEIGHT: 62 IN | TEMPERATURE: 97.8 F | HEART RATE: 67 BPM | WEIGHT: 112.13 LBS | OXYGEN SATURATION: 100 % | RESPIRATION RATE: 16 BRPM | BODY MASS INDEX: 20.63 KG/M2

## 2025-02-26 DIAGNOSIS — H43.391 VITREOUS FLOATERS OF RIGHT EYE: ICD-10-CM

## 2025-02-26 DIAGNOSIS — Z01.818 PREOP GENERAL PHYSICAL EXAM: Primary | ICD-10-CM

## 2025-02-26 PROCEDURE — 99213 OFFICE O/P EST LOW 20 MIN: CPT | Performed by: PEDIATRICS

## 2025-02-26 PROCEDURE — 3078F DIAST BP <80 MM HG: CPT | Performed by: PEDIATRICS

## 2025-02-26 PROCEDURE — G2211 COMPLEX E/M VISIT ADD ON: HCPCS | Performed by: PEDIATRICS

## 2025-02-26 PROCEDURE — 3074F SYST BP LT 130 MM HG: CPT | Performed by: PEDIATRICS

## 2025-02-26 PROCEDURE — 1126F AMNT PAIN NOTED NONE PRSNT: CPT | Performed by: PEDIATRICS

## 2025-02-26 RX ORDER — PREDNISOLONE ACETATE 10 MG/ML
SUSPENSION/ DROPS OPHTHALMIC
COMMUNITY
Start: 2025-02-12

## 2025-02-26 RX ORDER — OFLOXACIN 3 MG/ML
SOLUTION/ DROPS OPHTHALMIC
COMMUNITY
Start: 2025-02-12

## 2025-02-26 ASSESSMENT — PAIN SCALES - GENERAL: PAINLEVEL_OUTOF10: NO PAIN (0)

## 2025-02-26 NOTE — PROGRESS NOTES
Preoperative Evaluation  [facility]  [address]  [address]  [place] [address]  Phone: [phone]  Fax: [phone]  Primary Provider: Soraya Bautista MD  Pre-op Performing Provider: Soraya Bautista MD  Feb 26, 2025 2/23/2025   Surgical Information   What procedure is being done? Remove floaters from eye   Facility or Hospital where procedure/surgery will be performed: [facility]   Who is doing the procedure / surgery? Dr ryan bui   Date of surgery / procedure: March 11, 2025   Time of surgery / procedure: 7am   Where do you plan to recover after surgery? at home with family     Fax number for surgical facility: [phone]    Assessment & Plan     The proposed surgical procedure is considered LOW risk.    (Z01.818) Preop general physical exam  (primary encounter diagnosis)  Comment: cleared  Plan:     (H43.391) Vitreous floaters of right eye  Comment: cleared  Plan:     The longitudinal plan of care for the diagnosis(es)/condition(s) as documented were addressed during this visit. Due to the added complexity in care, I will continue to support Estefany Lira in the subsequent management and with ongoing continuity of care.              - No identified additional risk factors other than previously addressed    Preoperative Medication Instructions  Antiplatelet or Anticoagulation Medication Instructions   - We reviewed the medication list and the patient is not on an antiplatelet or anticoagulation medications.    Additional Medication Instructions   - Herbal medications and vitamins: DO NOT TAKE 7 days prior to surgery.    No ibuprofen one day prior. Ok to continue tylenol is needed.     Recommendation  Approval given to proceed with proposed procedure, without further diagnostic evaluation.    Subjective   Estefany Lira is a 64 year old, presenting for the following:  Pre-Op Exam          2/26/2025    10:16 AM   Additional Questions    Roomed by Carol Mendez CMA   Accompanied by N/A         2/26/2025    10:16 AM   Patient Reported Additional Medications   Patient reports taking the following new medications N/A     HPI related to upcoming procedure: Patient with right eye floaters, progressive.         2/23/2025   Pre-Op Questionnaire   Have you ever had a heart attack or stroke? No   Have you ever had surgery on your heart or blood vessels, such as a stent placement, a coronary artery bypass, or surgery on an artery in your head, neck, heart, or legs? No   Do you have chest pain with activity? No   Do you have a history of heart failure? No   Do you currently have a cold, bronchitis or symptoms of other infection? No   Do you have a cough, shortness of breath, or wheezing? No   Do you or anyone in your family have previous history of blood clots? No   Do you or does anyone in your family have a serious bleeding problem such as prolonged bleeding following surgeries or cuts? No   Have you ever had problems with anemia or been told to take iron pills? No   Have you had any abnormal blood loss such as black, tarry or bloody stools, or abnormal vaginal bleeding? No   Have you ever had a blood transfusion? No   Are you willing to have a blood transfusion if it is medically needed before, during, or after your surgery? Yes   Have you or any of your relatives ever had problems with anesthesia? (!) YES Patient has nausea with general anesthesia. With light sedation very low BP.    Do you have sleep apnea, excessive snoring or daytime drowsiness? No   Do you have any artifical heart valves or other implanted medical devices like a pacemaker, defibrillator, or continuous glucose monitor? No   Do you have artificial joints? No   Are you allergic to latex? No     Health Care Directive  Patient does not have a Health Care Directive: Discussed advance care planning with patient; information given to patient to review.    Preoperative Review of     "reviewed - no record of controlled substances prescribed.      Patient Active Problem List    Diagnosis Date Noted    Lichen sclerosus 09/30/2019     Priority: Medium    Bilateral low back pain without sciatica 06/13/2016     Priority: Medium    Hip pain, right 06/13/2016     Priority: Medium      Past Medical History:   Diagnosis Date    Adhesive capsulitis of shoulder     Created by Conversion     Anemia, unspecified     Created by Conversion     Incontinence     Leukocytopenia, unspecified     Created by Conversion      Past Surgical History:   Procedure Laterality Date    EYE SURGERY  Cataract 2021    MICRODISCECTOMY LUMBAR      ORTHOPEDIC SURGERY  2010    Z REPAIR BLADDER WOUND/INJ,SIMPLE      Description: Bladder Cystorrhaphy;  Recorded: 05/06/2008;     Current Outpatient Medications   Medication Sig Dispense Refill    ofloxacin (OCUFLOX) 0.3 % ophthalmic solution       prednisoLONE acetate (PRED FORTE) 1 % ophthalmic suspension       calcium gluconate 500 MG tablet Take 500 mg by mouth 2 times daily      Collagen-Vitamin C-Biotin (COLLAGEN 1500/C PO)       EPINEPHrine (ANY BX GENERIC EQUIV) 0.3 MG/0.3ML injection 2-pack Inject 0.3 mLs (0.3 mg) into the muscle as needed for anaphylaxis. May repeat one time in 5-15 minutes if response to initial dose is inadequate. 2 each 11    oxyBUTYnin ER (DITROPAN XL) 10 MG 24 hr tablet Take 1 tablet (10 mg) by mouth daily. 90 tablet 3       Allergies   Allergen Reactions    Shellfish-Derived Products Anaphylaxis    Iodine Other (See Comments)        Social History     Tobacco Use    Smoking status: Former     Types: Cigarettes     Passive exposure: Past    Smokeless tobacco: Never   Substance Use Topics    Alcohol use: Yes       History   Drug Use Unknown               Objective    /67 (BP Location: Right arm, Patient Position: Sitting, Cuff Size: Adult Regular)   Pulse 67   Temp 97.8  F (36.6  C) (Oral)   Resp 16   Ht 1.575 m (5' 2\")   Wt 50.9 kg (112 lb 2 " "oz)   SpO2 100%   BMI 20.51 kg/m     Estimated body mass index is 20.51 kg/m  as calculated from the following:    Height as of this encounter: 1.575 m (5' 2\").    Weight as of this encounter: 50.9 kg (112 lb 2 oz).  Physical Exam  GENERAL: alert and no distress  EYES: Eyes grossly normal to inspection, PERRL and conjunctivae and sclerae normal  HENT: ear canals and TM's normal, nose and mouth without ulcers or lesions  NECK: no adenopathy, no asymmetry, masses, or scars  RESP: lungs clear to auscultation - no rales, rhonchi or wheezes  CV: regular rate and rhythm, normal S1 S2, no S3 or S4, no murmur, click or rub, no peripheral edema  ABDOMEN: soft, nontender, no hepatosplenomegaly, no masses and bowel sounds normal  MS: no gross musculoskeletal defects noted, no edema  SKIN: no suspicious lesions or rashes  NEURO: Normal strength and tone, mentation intact and speech normal  PSYCH: mentation appears normal, affect normal/bright      Diagnostics  No labs were ordered during this visit.   No EKG required for low risk surgery (cataract, skin procedure, breast biopsy, etc).    Revised Cardiac Risk Index (RCRI)  The patient has the following serious cardiovascular risks for perioperative complications:   - No serious cardiac risks = 0 points     RCRI Interpretation: 0 points: Class I (very low risk - 0.4% complication rate)         Signed Electronically by: Soraya Bautista MD  A copy of this evaluation report is provided to the requesting physician.         "

## 2025-02-26 NOTE — PATIENT INSTRUCTIONS
How to Take Your Medication Before Surgery  Preoperative Medication Instructions   Antiplatelet or Anticoagulation Medication Instructions   - We reviewed the medication list and the patient is not on an antiplatelet or anticoagulation medications.    Additional Medication Instructions   - Herbal medications and vitamins: DO NOT TAKE 7 days prior to surgery.    No ibuprofen one day prior. Ok to continue tylenol is needed.        Patient Education   Preparing for Your Surgery  For Adults  Getting started  In most cases, a nurse will call to review your health history and instructions. They will give you an arrival time based on your scheduled surgery time. Please be ready to share:  Your doctor's clinic name and phone number  Your medical, surgical, and anesthesia history  A list of allergies and sensitivities  A list of medicines, including herbal treatments and over-the-counter drugs  Whether the patient has a legal guardian (ask how to send us the papers in advance)  Note: You may not receive a call if you were seen at our PAC (Preoperative Assessment Center).  Please tell us if you're pregnant--or if there's any chance you might be pregnant. Some surgeries may injure a fetus (unborn baby), so they require a pregnancy test. Surgeries that are safe for a fetus don't always need a test, and you can choose whether to have one.   Preparing for surgery  Within 10 to 30 days of surgery: Have a pre-op exam (sometimes called an H&P, or History and Physical). This can be done at a clinic or pre-operative center.  If you're having a , you may not need this exam. Talk to your care team.  At your pre-op exam, talk to your care team about all medicines you take. (This includes CBD oil and any drugs, such as THC, marijuana, and other forms of cannabis.) If you need to stop any medicine before surgery, ask when to start taking it again.  This is for your safety. Many medicines and drugs can make you bleed too much  during surgery. Some change how well surgery (anesthesia) drugs work.  Call your insurance company to let them know you're having surgery. (If you don't have insurance, call [phone].)  Call your clinic if there's any change in your health. This includes a scrape or scratch near the surgery site, or any signs of a cold (sore throat, runny nose, cough, rash, fever).  Eating and drinking guidelines  For your safety: Unless your surgeon tells you otherwise, follow the guidelines below.  Eat and drink as normal until 8 hours before you arrive for surgery. After that, no food or milk. You can spit out gum when you arrive.  Drink clear liquids until 2 hours before you arrive. These are liquids you can see through, like water, Gatorade, and Propel Water. They also include plain black coffee and tea (no cream or milk).  No alcohol for 24 hours before you arrive. The night before surgery, stop any drinks that contain THC.  If your care team tells you to take medicine on the morning of surgery, it's okay to take it with a sip of water. No other medicines or drugs are allowed (including CBD oil)--follow your care team's instructions.  If you have questions the day of surgery, call your hospital or surgery center.   Preventing infection  Shower or bathe the night before and the morning of surgery. Follow the instructions your clinic gave you. (If no instructions, use regular soap.)  Don't shave or clip hair near your surgery site. We'll remove the hair if needed.  Don't smoke or vape the morning of surgery. No chewing tobacco for 6 hours before you arrive. A nicotine patch is okay. You may spit out nicotine gum when you arrive.  For some surgeries, the surgeon will tell you to fully quit smoking and nicotine.  We will make every effort to keep you safe from infection. We will:  Clean our hands often with soap and water (or an alcohol-based hand rub).  Clean the skin at your surgery site with a special soap that kills  germs.  Give you a special gown to keep you warm. (Cold raises the risk of infection.)  Wear hair covers, masks, gowns, and gloves during surgery.  Give antibiotic medicine, if prescribed. Not all surgeries need this medicine.  What to bring on the day of surgery  Photo ID and insurance card  Copy of your health care directive, if you have one  Glasses and hearing aids (bring cases)  You can't wear contacts during surgery  Inhaler and eye drops, if you use them (tell us about these when you arrive)  CPAP machine or breathing device, if you use them  A few personal items, if spending the night  If you have . . .  A pacemaker, ICD (cardiac defibrillator), or other implant: Bring the ID card.  An implanted stimulator: Bring the remote control.  A legal guardian: Bring a copy of the certified (court-stamped) guardianship papers.  Please remove any jewelry, including body piercings. Leave jewelry and other valuables at home.  If you're going home the day of surgery  You must have a responsible adult drive you home. They should stay with you overnight as well.  If you don't have someone to stay with you, and you aren't safe to go home alone, we may keep you overnight. Insurance often won't pay for this.  After surgery  If it's hard to control your pain or you need more pain medicine, please call your surgeon's office.  Questions?   If you have any questions for your care team, list them here:   ____________________________________________________________________________________________________________________________________________________________________________________________________________________________________________________________  For informational purposes only. Not to replace the advice of your health care provider. Copyright   2003, 2019 [facility]. All rights reserved. Clinically reviewed by Manjeet Arteaga MD. [employer] [phone] - REV 08/24.

## 2025-04-29 ENCOUNTER — TRANSFERRED RECORDS (OUTPATIENT)
Dept: HEALTH INFORMATION MANAGEMENT | Facility: CLINIC | Age: 65
End: 2025-04-29
Payer: COMMERCIAL